# Patient Record
Sex: FEMALE | Race: WHITE | NOT HISPANIC OR LATINO | Employment: FULL TIME | URBAN - METROPOLITAN AREA
[De-identification: names, ages, dates, MRNs, and addresses within clinical notes are randomized per-mention and may not be internally consistent; named-entity substitution may affect disease eponyms.]

---

## 2017-10-03 LAB
INFLUENZA A ANTIGEN, POC: NEGATIVE
INFLUENZA B ANTIGEN, POC: NEGATIVE

## 2017-11-06 LAB — RAPID GROUP A STREP (OHS): NEGATIVE

## 2022-04-10 ENCOUNTER — HISTORICAL (OUTPATIENT)
Dept: ADMINISTRATIVE | Facility: HOSPITAL | Age: 42
End: 2022-04-10

## 2022-04-25 VITALS
HEIGHT: 62 IN | SYSTOLIC BLOOD PRESSURE: 139 MMHG | BODY MASS INDEX: 36.8 KG/M2 | DIASTOLIC BLOOD PRESSURE: 91 MMHG | OXYGEN SATURATION: 97 % | WEIGHT: 200 LBS

## 2022-09-18 ENCOUNTER — HISTORICAL (OUTPATIENT)
Dept: ADMINISTRATIVE | Facility: HOSPITAL | Age: 42
End: 2022-09-18

## 2023-01-03 DIAGNOSIS — G43.109 MIGRAINE WITH AURA AND WITHOUT STATUS MIGRAINOSUS, NOT INTRACTABLE: Primary | ICD-10-CM

## 2023-02-01 ENCOUNTER — CLINICAL SUPPORT (OUTPATIENT)
Dept: URGENT CARE | Facility: CLINIC | Age: 43
End: 2023-02-01

## 2023-02-01 DIAGNOSIS — J02.9 SORE THROAT: ICD-10-CM

## 2023-02-01 DIAGNOSIS — J02.9 SORE THROAT: Primary | ICD-10-CM

## 2023-02-01 LAB
CTP QC/QA: YES
SARS-COV-2 RDRP RESP QL NAA+PROBE: POSITIVE

## 2023-02-01 PROCEDURE — 87635 SARS-COV-2 COVID-19 AMP PRB: CPT | Mod: QW,,, | Performed by: INTERNAL MEDICINE

## 2023-02-01 PROCEDURE — 87635: ICD-10-PCS | Mod: QW,,, | Performed by: INTERNAL MEDICINE

## 2023-02-01 NOTE — PROGRESS NOTES
Pt presents to clinic for employee Covid testing. Order released; positive results entered in chart. Verbally relayed results and Instructed patient to notify one up.

## 2023-03-02 ENCOUNTER — OFFICE VISIT (OUTPATIENT)
Dept: NEUROLOGY | Facility: CLINIC | Age: 43
End: 2023-03-02
Payer: COMMERCIAL

## 2023-03-02 VITALS
DIASTOLIC BLOOD PRESSURE: 96 MMHG | HEART RATE: 84 BPM | WEIGHT: 174 LBS | BODY MASS INDEX: 32.02 KG/M2 | SYSTOLIC BLOOD PRESSURE: 140 MMHG | HEIGHT: 62 IN

## 2023-03-02 DIAGNOSIS — G62.9 PERIPHERAL POLYNEUROPATHY: ICD-10-CM

## 2023-03-02 DIAGNOSIS — E34.8 PINEAL GLAND CYST: ICD-10-CM

## 2023-03-02 DIAGNOSIS — G43.E09 CHRONIC MIGRAINE WITH AURA: ICD-10-CM

## 2023-03-02 DIAGNOSIS — G43.109 MIGRAINE WITH AURA AND WITHOUT STATUS MIGRAINOSUS, NOT INTRACTABLE: ICD-10-CM

## 2023-03-02 PROCEDURE — 4010F ACE/ARB THERAPY RXD/TAKEN: CPT | Mod: CPTII,S$GLB,, | Performed by: NURSE PRACTITIONER

## 2023-03-02 PROCEDURE — 99204 OFFICE O/P NEW MOD 45 MIN: CPT | Mod: S$GLB,,, | Performed by: NURSE PRACTITIONER

## 2023-03-02 PROCEDURE — 99999 PR PBB SHADOW E&M-EST. PATIENT-LVL III: CPT | Mod: PBBFAC,,, | Performed by: NURSE PRACTITIONER

## 2023-03-02 PROCEDURE — 1159F MED LIST DOCD IN RCRD: CPT | Mod: CPTII,S$GLB,, | Performed by: NURSE PRACTITIONER

## 2023-03-02 PROCEDURE — 99999 PR PBB SHADOW E&M-EST. PATIENT-LVL III: ICD-10-PCS | Mod: PBBFAC,,, | Performed by: NURSE PRACTITIONER

## 2023-03-02 PROCEDURE — 1159F PR MEDICATION LIST DOCUMENTED IN MEDICAL RECORD: ICD-10-PCS | Mod: CPTII,S$GLB,, | Performed by: NURSE PRACTITIONER

## 2023-03-02 PROCEDURE — 1160F PR REVIEW ALL MEDS BY PRESCRIBER/CLIN PHARMACIST DOCUMENTED: ICD-10-PCS | Mod: CPTII,S$GLB,, | Performed by: NURSE PRACTITIONER

## 2023-03-02 PROCEDURE — 3077F PR MOST RECENT SYSTOLIC BLOOD PRESSURE >= 140 MM HG: ICD-10-PCS | Mod: CPTII,S$GLB,, | Performed by: NURSE PRACTITIONER

## 2023-03-02 PROCEDURE — 99204 PR OFFICE/OUTPT VISIT, NEW, LEVL IV, 45-59 MIN: ICD-10-PCS | Mod: S$GLB,,, | Performed by: NURSE PRACTITIONER

## 2023-03-02 PROCEDURE — 4010F PR ACE/ARB THEARPY RXD/TAKEN: ICD-10-PCS | Mod: CPTII,S$GLB,, | Performed by: NURSE PRACTITIONER

## 2023-03-02 PROCEDURE — 3008F BODY MASS INDEX DOCD: CPT | Mod: CPTII,S$GLB,, | Performed by: NURSE PRACTITIONER

## 2023-03-02 PROCEDURE — 3080F PR MOST RECENT DIASTOLIC BLOOD PRESSURE >= 90 MM HG: ICD-10-PCS | Mod: CPTII,S$GLB,, | Performed by: NURSE PRACTITIONER

## 2023-03-02 PROCEDURE — 3077F SYST BP >= 140 MM HG: CPT | Mod: CPTII,S$GLB,, | Performed by: NURSE PRACTITIONER

## 2023-03-02 PROCEDURE — 3008F PR BODY MASS INDEX (BMI) DOCUMENTED: ICD-10-PCS | Mod: CPTII,S$GLB,, | Performed by: NURSE PRACTITIONER

## 2023-03-02 PROCEDURE — 1160F RVW MEDS BY RX/DR IN RCRD: CPT | Mod: CPTII,S$GLB,, | Performed by: NURSE PRACTITIONER

## 2023-03-02 PROCEDURE — 3080F DIAST BP >= 90 MM HG: CPT | Mod: CPTII,S$GLB,, | Performed by: NURSE PRACTITIONER

## 2023-03-02 RX ORDER — METFORMIN HYDROCHLORIDE 1000 MG/1
1 TABLET, FILM COATED, EXTENDED RELEASE ORAL 2 TIMES DAILY
COMMUNITY

## 2023-03-02 RX ORDER — RIZATRIPTAN BENZOATE 10 MG/1
1 TABLET ORAL
COMMUNITY

## 2023-03-02 RX ORDER — CETIRIZINE HYDROCHLORIDE 10 MG/1
10 TABLET ORAL NIGHTLY
COMMUNITY
Start: 2023-02-28 | End: 2024-03-15

## 2023-03-02 RX ORDER — ASPIRIN 81 MG/1
1 TABLET ORAL DAILY
COMMUNITY
Start: 2018-06-04

## 2023-03-02 RX ORDER — CARVEDILOL 6.25 MG/1
6.25 TABLET ORAL 2 TIMES DAILY
COMMUNITY
Start: 2022-10-03 | End: 2023-06-10

## 2023-03-02 RX ORDER — VENLAFAXINE HYDROCHLORIDE 150 MG/1
150 CAPSULE, EXTENDED RELEASE ORAL DAILY
COMMUNITY
Start: 2022-12-01 | End: 2024-03-15

## 2023-03-02 RX ORDER — PRAVASTATIN SODIUM 20 MG/1
20 TABLET ORAL NIGHTLY
COMMUNITY
Start: 2022-05-16 | End: 2024-03-15

## 2023-03-02 RX ORDER — TOPIRAMATE 50 MG/1
1 TABLET, FILM COATED ORAL NIGHTLY
COMMUNITY
Start: 2019-01-07 | End: 2024-03-15

## 2023-03-02 RX ORDER — IPRATROPIUM BROMIDE AND ALBUTEROL SULFATE 2.5; .5 MG/3ML; MG/3ML
3 SOLUTION RESPIRATORY (INHALATION) EVERY 6 HOURS PRN
COMMUNITY
Start: 2023-01-03 | End: 2024-03-15

## 2023-03-02 RX ORDER — MONTELUKAST SODIUM 10 MG/1
10 TABLET ORAL NIGHTLY
COMMUNITY
Start: 2021-05-04 | End: 2023-07-15

## 2023-03-02 RX ORDER — INSULIN LISPRO 100 [IU]/ML
INJECTION, SOLUTION INTRAVENOUS; SUBCUTANEOUS
COMMUNITY
Start: 2023-02-06

## 2023-03-02 RX ORDER — AZELASTINE 1 MG/ML
1 SPRAY, METERED NASAL DAILY
COMMUNITY
Start: 2023-02-28 | End: 2024-03-15

## 2023-03-02 RX ORDER — BENAZEPRIL HYDROCHLORIDE 20 MG/1
20 TABLET ORAL DAILY
COMMUNITY
Start: 2023-02-28 | End: 2023-11-10

## 2023-03-02 RX ORDER — FLUTICASONE PROPIONATE 50 MCG
2 SPRAY, SUSPENSION (ML) NASAL DAILY
COMMUNITY
Start: 2023-02-28 | End: 2023-08-27

## 2023-03-02 RX ORDER — SEMAGLUTIDE 1.34 MG/ML
0.5 INJECTION, SOLUTION SUBCUTANEOUS
Status: ON HOLD | COMMUNITY
Start: 2022-09-09 | End: 2023-04-13 | Stop reason: HOSPADM

## 2023-03-02 RX ORDER — ALBUTEROL SULFATE 0.83 MG/ML
2.5 SOLUTION RESPIRATORY (INHALATION) EVERY 6 HOURS PRN
COMMUNITY
Start: 2023-02-28

## 2023-03-02 RX ORDER — OMEPRAZOLE 20 MG/1
20 CAPSULE, DELAYED RELEASE ORAL
COMMUNITY

## 2023-03-02 RX ORDER — FLUTICASONE FUROATE, UMECLIDINIUM BROMIDE AND VILANTEROL TRIFENATATE 200; 62.5; 25 UG/1; UG/1; UG/1
1 POWDER RESPIRATORY (INHALATION) NIGHTLY
COMMUNITY
Start: 2023-02-08

## 2023-03-02 RX ORDER — GABAPENTIN 800 MG/1
1 TABLET ORAL NIGHTLY
COMMUNITY

## 2023-03-02 NOTE — ASSESSMENT & PLAN NOTE
-start Emgality  -Has already tried imitrex, relpax, maxalt.  Try Ubrelvy prn  -If emgality helpful and wanting to wean of topamax prior to her next follow up, she can message me and I will give her weaning instructions

## 2023-03-02 NOTE — PROGRESS NOTES
Subjective:       Patient ID: Riya Red is a 42 y.o. female.    Chief Complaint: Migraine (NP: Referred by Dr. Mani Thapa for Neuro consult to evaluate for Migraines: Since age 13 started having migraines. Migraines are 3 - 4 times a week, located to various areas of head. Migraines lasts for 3 days. Pain is sharp, stabbing: It feels like her skull is going to crack open. Does get a lot of nausea. Vision get both blurry and double. Migraines are sensitive to light, sound, and odor. Almost all the time has lot of neck pain.)      History of Present Illness:  New patient referred by Dr. Mani Thapa for migraines.  42-year-old woman with a relatively complex medical history including postsurgical DVTs, pulmonary embolism, thoracic outlet syndrome requiring subclavian aneurysm repair and rib resection.  She began having migraines as a child.  Throughout her life, she has seen several different doctors and has tried several different medications including magnesium, B2, Effexor, Cymbalta, gabapentin, Topamax, nortriptyline, Imitrex, Relpax, Maxalt.  When she contracted COVID in 2020, her migraines became much more severe in nature.  She did require hospitalization, but was not vented.  Over the last year so, her migraines have been occurring 3 to 4 times a week and will last for about 2-3 days at a time, but have lasted up to 6 days.  She is currently on Topamax for preventive and Maxalt for rescue.  When she 1st started the Topamax many years ago, it was very helpful for her, but no longer seems to be effective.  She has side effects of word-finding and brain fog so an increase in dosage is not ideal.  She says the Maxalt is helpful, but only lasts for about 4 hours.  Her migraines can occur in several different regions including the right or left temporal region, the base of her skull, or retro orbitally on either side.  She describes the pain as is severe ice pick type pain with associated photophobia,  phonophobia, osmophobia, and nausea.  Before she gets a migraine, she sees flashes of lights in her vision and experiences word-finding as well as an increase in yawning.  Triggers include weather changes and her menstrual cycle.  Family history of migraines in her son.    In 2018, she developed a lower extremity DVT postoperatively after an ankle surgery.  The DVT ultimately resolved.  Later, she developed a DVT in the right upper extremity that led to a PE requiring immediate clot removal.  She was found to have diabetes at that time.  Her A1c was greater than 13.  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  This was done over 10 years ago and we do not have the records.      Review of Systems  I have reviewed a 12 point review of systems which is scanned into the chart        Past Medical History:   Diagnosis Date    DVT (deep venous thrombosis)     Essential (primary) hypertension     Migraine     Mild intermittent asthma, uncomplicated     Mixed hyperlipidemia     Other pulmonary embolism without acute cor pulmonale     Type 2 diabetes mellitus without complications        Past Surgical History:   Procedure Laterality Date     SECTION      CHOLECYSTECTOMY      2015    EMBOLECTOMY      THORACIC OUTLET SURGERY          Family History   Problem Relation Age of Onset    Dementia Mother     Hypertension Mother     Depression Mother         Social History     Socioeconomic History    Marital status:    Tobacco Use    Smoking status: Never    Smokeless tobacco: Never   Substance and Sexual Activity    Alcohol use: Never    Drug use: Never        Outpatient Encounter Medications as of 3/2/2023   Medication Sig Dispense Refill    albuterol (PROVENTIL) 2.5 mg /3 mL (0.083 %) nebulizer solution Inhale 2.5 mg into the lungs every 6 (six) hours as needed.      albuterol-ipratropium (DUO-NEB) 2.5 mg-0.5 mg/3 mL nebulizer solution Inhale 3 mLs into the  lungs every 6 (six) hours as needed.      aspirin (ECOTRIN) 81 MG EC tablet Take 1 tablet by mouth Daily.      azelastine (ASTELIN) 137 mcg (0.1 %) nasal spray 1 spray by Nasal route Daily.      benazepriL (LOTENSIN) 20 MG tablet Take 20 mg by mouth Daily.      carvediloL (COREG) 6.25 MG tablet Take 6.25 mg by mouth 2 (two) times a day.      cetirizine (ZYRTEC) 10 MG tablet Take 10 mg by mouth every evening.      fluticasone propionate (FLONASE) 50 mcg/actuation nasal spray 2 sprays by Nasal route Daily.      gabapentin (NEURONTIN) 800 MG tablet Take 1 tablet by mouth every evening.      insulin lispro 100 unit/mL injection Inject into the skin as needed.      metFORMIN (GLUMETZA) 1000 MG (MOD) 24hr tablet Take 1 tablet by mouth 2 (two) times a day.      montelukast (SINGULAIR) 10 mg tablet Take 10 mg by mouth every evening.      omeprazole (PRILOSEC) 20 MG capsule Take 20 mg by mouth as needed.      OZEMPIC 0.25 mg or 0.5 mg(2 mg/1.5 mL) pen injector Inject 0.5 mg into the skin every 7 days.      pravastatin (PRAVACHOL) 20 MG tablet Take 20 mg by mouth every evening.      rizatriptan (MAXALT) 10 MG tablet Take 1 tablet by mouth as needed.      topiramate (TOPAMAX) 50 MG tablet Take 1 tablet by mouth every evening.      TRELEGY ELLIPTA 200-62.5-25 mcg inhaler Inhale 1 puff into the lungs every evening.      venlafaxine (EFFEXOR-XR) 150 MG Cp24 Take 150 mg by mouth Daily.      [DISCONTINUED] ubrogepant (UBRELVY) 100 mg tablet Take 100 mg by mouth daily as needed.      [] galcanezumab-gnlm 120 mg/mL PnIj Inject 240 mg into the skin once. 240 mg loading dose (administered as two consecutive injections of 120 mg each) for 1 dose 2 mL 0    galcanezumab-gnlm 120 mg/mL PnIj Inject 120 mg into the skin every 28 days. maintenance dose 1 mL 5    [] ubrogepant (UBRELVY) 100 mg tablet Take 1 tablet (100 mg total) by mouth once as needed for Migraine (can repeat dose in 2 hour if needed, do not exceed 200 mg in 24  "hours). 16 tablet 5     No facility-administered encounter medications on file as of 3/2/2023.      Objective:   BP (!) 140/96 (BP Location: Left arm)   Pulse 84   Ht 5' 2" (1.575 m)   Wt 78.9 kg (174 lb)   BMI 31.83 kg/m²        Physical Exam  General:  Alert and oriented  NAD  No overt edema    Cognition and Comprehension:  Speech and language intact  Follows commands    Cranial nerves:   CN 2_ Visual fields (full to confrontation both eyes)  CN 3, 4, 6_ Intact, ROBBY, no nystagmus  CN 5_facial tactile sensation intact  CN 7_no face asymmetry; normal eye closure and smile  CN 8_hearing intact to spoken voice  CN 9, 10, 11_voice normal, shoulder shrug ok; deltoids not fatigable   CN 12 tongue_protrudes mid line    Muscle Strength and Tone:  Normal upper extremity tone  Normal lower extremity tone  Normal upper extremity strength  Normal lower extremity strength    Reflexes:  Globally hypo  Absent ankle jerks    Sensation:  Decr sensation to PP BUE and BLE in gradient distribution    Coordination and Gait:  Coordination and gait are normal   No ataxia with FTN or HKS      Assessment & Plan:      1. Chronic migraine with aura  Overview:  She began having migraines as a child.  She has tried several different medications including magnesium, B2, Effexor, Cymbalta, gabapentin, Topamax, nortriptyline, Imitrex, Relpax, Maxalt.  She is having migraines 3 to 4 times a week and they will last for about 2-3 days at a time, but have lasted up to 6 days.  She is currently on Topamax for preventive and Maxalt for rescue.  When she 1st started the Topamax many years ago, it was very helpful for her, but no longer seems to be effective.  She has side effects of word-finding and brain fog so an increase in dosage is not ideal.  She says the Maxalt is helpful, but only lasts for about 4 hours.  Her migraines can occur in several different regions including the right or left temporal region, the base of her skull, or retro " orbitally on either side.  She describes the pain as is severe ice pick type pain with associated photophobia, phonophobia, osmophobia, and nausea.  Before she gets a migraine, she sees flashes of lights in her vision and experiences word-finding as well as an increase in yawning.  Triggers include weather changes and her menstrual cycle    Assessment & Plan:  -start Emgality  -Has already tried imitrex, relpax, maxalt.  Try Ubrelvy prn  -If emgality helpful and wanting to wean of topamax prior to her next follow up, she can message me and I will give her weaning instructions        2. Pineal gland cyst  Assessment & Plan:  -Need updated brain imaging given last MRI was over 10 years ago in [place]    Orders:  -     MRI Brain W WO Contrast; Future; Expected date: 03/02/2023    3. Peripheral polyneuropathy  Assessment & Plan:  -Likely diabetic neuropathy.  TSH normal.  Get B12 and NONA.  Not painful so will just monitor clinically    Orders:  -     Vitamin B12; Future; Expected date: 03/02/2023  -     Immunofixation & Protein Electrophoresis & Immunoglobulins; Future; Expected date: 03/02/2023    4. Migraine with aura and without status migrainosus, not intractable  -     Ambulatory referral/consult to Neurology  -     galcanezumab-gnlm 120 mg/mL PnIj; Inject 240 mg into the skin once. 240 mg loading dose (administered as two consecutive injections of 120 mg each) for 1 dose  Dispense: 2 mL; Refill: 0  -     galcanezumab-gnlm 120 mg/mL PnIj; Inject 120 mg into the skin every 28 days. maintenance dose  Dispense: 1 mL; Refill: 5  -     ubrogepant (UBRELVY) 100 mg tablet; Take 1 tablet (100 mg total) by mouth once as needed for Migraine (can repeat dose in 2 hour if needed, do not exceed 200 mg in 24 hours).  Dispense: 16 tablet; Refill: 5          This note was created with the assistance of voice recognition software. There may be transcription errors as a result of using this technology however minimal. Effort has been  made to assure accuracy of transcription but any obvious errors or omissions should be clarified with the author of the document.

## 2023-03-02 NOTE — ASSESSMENT & PLAN NOTE
-Likely diabetic neuropathy.  TSH normal.  Get B12 and NONA.  Not painful so will just monitor clinically

## 2023-03-07 ENCOUNTER — TELEPHONE (OUTPATIENT)
Dept: NEUROLOGY | Facility: CLINIC | Age: 43
End: 2023-03-07
Payer: COMMERCIAL

## 2023-03-10 ENCOUNTER — LAB VISIT (OUTPATIENT)
Dept: LAB | Facility: HOSPITAL | Age: 43
End: 2023-03-10
Attending: NURSE PRACTITIONER
Payer: COMMERCIAL

## 2023-03-10 DIAGNOSIS — G62.9 PERIPHERAL POLYNEUROPATHY: ICD-10-CM

## 2023-03-10 LAB
IGA SERPL-MCNC: 175 MG/DL (ref 65–421)
IGG SERPL-MCNC: 1020 MG/DL (ref 522–1631)
IGM SERPL-MCNC: 48 MG/DL (ref 33–293)
VIT B12 SERPL-MCNC: 336 PG/ML (ref 213–816)

## 2023-03-10 PROCEDURE — 82784 ASSAY IGA/IGD/IGG/IGM EACH: CPT

## 2023-03-10 PROCEDURE — 86334 IMMUNOFIX E-PHORESIS SERUM: CPT

## 2023-03-10 PROCEDURE — 84165 PROTEIN E-PHORESIS SERUM: CPT

## 2023-03-10 PROCEDURE — 83521 IG LIGHT CHAINS FREE EACH: CPT | Mod: 90

## 2023-03-10 PROCEDURE — 36415 COLL VENOUS BLD VENIPUNCTURE: CPT

## 2023-03-10 PROCEDURE — 82607 VITAMIN B-12: CPT

## 2023-03-13 ENCOUNTER — TELEPHONE (OUTPATIENT)
Dept: NEUROLOGY | Facility: CLINIC | Age: 43
End: 2023-03-13
Payer: COMMERCIAL

## 2023-03-13 LAB
ALBUMIN % SPEP (OHS): 48.16
ALBUMIN SERPL-MCNC: 3.3 G/DL (ref 3.5–5)
ALBUMIN/GLOB SERPL: 0.9 RATIO (ref 1.1–2)
ALPHA 1 GLOB (OHS): 0.26 GM/DL
ALPHA 1 GLOB% (OHS): 3.79
ALPHA 2 GLOB % (OHS): 11.48
ALPHA 2 GLOB (OHS): 0.78 GM/DL
BETA GLOB (OHS): 1.35 GM/DL
BETA GLOB% (OHS): 19.86
GAMMA GLOBULIN % (OHS): 16.71
GAMMA GLOBULIN (OHS): 1.14 GM/DL
GLOBULIN SER-MCNC: 3.5 GM/DL (ref 2.4–3.5)
KAPPA LC FREE SER-MCNC: 1.76 MG/DL (ref 0.33–1.94)
KAPPA LC FREE/LAMBDA FREE SER: 1.07 {RATIO} (ref 0.26–1.65)
LAMBDA LC FREE SERPL-MCNC: 1.64 MG/DL (ref 0.57–2.63)
M SPIKE % (OHS): ABNORMAL
M SPIKE (OHS): ABNORMAL
PATH REV: NORMAL
PROT SERPL-MCNC: 6.8 GM/DL (ref 6.4–8.3)

## 2023-03-13 NOTE — TELEPHONE ENCOUNTER
----- Message from SERGIO Raymond sent at 3/13/2023  7:39 AM CDT -----  B12 is a little lower than we like in neurology.  We like B12 level at 500 or above.  Anything lower can contribute to neuropathy.  Have her supplement with b12 2000 mcg daily

## 2023-03-14 ENCOUNTER — PATIENT MESSAGE (OUTPATIENT)
Dept: NEUROLOGY | Facility: CLINIC | Age: 43
End: 2023-03-14
Payer: COMMERCIAL

## 2023-04-05 ENCOUNTER — PATIENT MESSAGE (OUTPATIENT)
Dept: NEUROLOGY | Facility: CLINIC | Age: 43
End: 2023-04-05
Payer: COMMERCIAL

## 2023-04-11 ENCOUNTER — PATIENT MESSAGE (OUTPATIENT)
Dept: NEUROLOGY | Facility: CLINIC | Age: 43
End: 2023-04-11
Payer: COMMERCIAL

## 2023-04-12 ENCOUNTER — HOSPITAL ENCOUNTER (OUTPATIENT)
Facility: HOSPITAL | Age: 43
Discharge: HOME | End: 2023-04-13
Attending: EMERGENCY MEDICINE | Admitting: STUDENT IN AN ORGANIZED HEALTH CARE EDUCATION/TRAINING PROGRAM
Payer: COMMERCIAL

## 2023-04-12 DIAGNOSIS — R10.10 UPPER ABDOMINAL PAIN: ICD-10-CM

## 2023-04-12 DIAGNOSIS — K85.30 DRUG-INDUCED ACUTE PANCREATITIS WITHOUT INFECTION OR NECROSIS: Primary | ICD-10-CM

## 2023-04-12 DIAGNOSIS — R07.9 CHEST PAIN: ICD-10-CM

## 2023-04-12 DIAGNOSIS — K85.90 ACUTE PANCREATITIS WITHOUT INFECTION OR NECROSIS, UNSPECIFIED PANCREATITIS TYPE: ICD-10-CM

## 2023-04-12 LAB
ALBUMIN SERPL-MCNC: 4.2 G/DL (ref 3.5–5)
ALBUMIN/GLOB SERPL: 1.4 RATIO (ref 1.1–2)
ALP SERPL-CCNC: 55 UNIT/L (ref 40–150)
ALT SERPL-CCNC: 13 UNIT/L (ref 0–55)
APPEARANCE UR: CLEAR
AST SERPL-CCNC: 12 UNIT/L (ref 5–34)
B-HCG SERPL QL: NEGATIVE
BACTERIA #/AREA URNS AUTO: ABNORMAL /HPF
BASOPHILS # BLD AUTO: 0.05 X10(3)/MCL (ref 0–0.2)
BASOPHILS NFR BLD AUTO: 0.7 %
BILIRUB UR QL STRIP.AUTO: NEGATIVE MG/DL
BILIRUBIN DIRECT+TOT PNL SERPL-MCNC: 0.3 MG/DL
BUN SERPL-MCNC: 9.6 MG/DL (ref 7–18.7)
CALCIUM SERPL-MCNC: 9.7 MG/DL (ref 8.4–10.2)
CHLORIDE SERPL-SCNC: 104 MMOL/L (ref 98–107)
CHOLEST SERPL-MCNC: 114 MG/DL
CHOLEST/HDLC SERPL: 4 {RATIO} (ref 0–5)
CO2 SERPL-SCNC: 25 MMOL/L (ref 22–29)
COLOR UR AUTO: YELLOW
CREAT SERPL-MCNC: 0.75 MG/DL (ref 0.55–1.02)
EOSINOPHIL # BLD AUTO: 0.1 X10(3)/MCL (ref 0–0.9)
EOSINOPHIL NFR BLD AUTO: 1.4 %
ERYTHROCYTE [DISTWIDTH] IN BLOOD BY AUTOMATED COUNT: 14.1 % (ref 11.5–17)
EST. AVERAGE GLUCOSE BLD GHB EST-MCNC: 128.4 MG/DL
GFR SERPLBLD CREATININE-BSD FMLA CKD-EPI: >60 MLS/MIN/1.73/M2
GLOBULIN SER-MCNC: 3 GM/DL (ref 2.4–3.5)
GLUCOSE SERPL-MCNC: 120 MG/DL (ref 74–100)
GLUCOSE UR QL STRIP.AUTO: NEGATIVE MG/DL
HBA1C MFR BLD: 6.1 %
HCT VFR BLD AUTO: 40.3 % (ref 37–47)
HDLC SERPL-MCNC: 29 MG/DL (ref 35–60)
HGB BLD-MCNC: 13.1 G/DL (ref 12–16)
IMM GRANULOCYTES # BLD AUTO: 0.03 X10(3)/MCL (ref 0–0.04)
IMM GRANULOCYTES NFR BLD AUTO: 0.4 %
KETONES UR QL STRIP.AUTO: NEGATIVE MG/DL
LDLC SERPL CALC-MCNC: 67 MG/DL (ref 50–140)
LEUKOCYTE ESTERASE UR QL STRIP.AUTO: NEGATIVE UNIT/L
LIPASE SERPL-CCNC: 631 U/L
LYMPHOCYTES # BLD AUTO: 1.81 X10(3)/MCL (ref 0.6–4.6)
LYMPHOCYTES NFR BLD AUTO: 24.7 %
MCH RBC QN AUTO: 26.9 PG (ref 27–31)
MCHC RBC AUTO-ENTMCNC: 32.5 G/DL (ref 33–36)
MCV RBC AUTO: 82.8 FL (ref 80–94)
MONOCYTES # BLD AUTO: 0.56 X10(3)/MCL (ref 0.1–1.3)
MONOCYTES NFR BLD AUTO: 7.7 %
NEUTROPHILS # BLD AUTO: 4.77 X10(3)/MCL (ref 2.1–9.2)
NEUTROPHILS NFR BLD AUTO: 65.1 %
NITRITE UR QL STRIP.AUTO: NEGATIVE
NRBC BLD AUTO-RTO: 0 %
PH UR STRIP.AUTO: 5.5 [PH]
PLATELET # BLD AUTO: 392 X10(3)/MCL (ref 130–400)
PMV BLD AUTO: 8.8 FL (ref 7.4–10.4)
POCT GLUCOSE: 79 MG/DL (ref 70–110)
POCT GLUCOSE: 90 MG/DL (ref 70–110)
POTASSIUM SERPL-SCNC: 3.7 MMOL/L (ref 3.5–5.1)
PROT SERPL-MCNC: 7.2 GM/DL (ref 6.4–8.3)
PROT UR QL STRIP.AUTO: NEGATIVE MG/DL
RBC # BLD AUTO: 4.87 X10(6)/MCL (ref 4.2–5.4)
RBC #/AREA URNS AUTO: 14 /HPF
RBC UR QL AUTO: NEGATIVE UNIT/L
SODIUM SERPL-SCNC: 137 MMOL/L (ref 136–145)
SP GR UR STRIP.AUTO: 1.02 (ref 1–1.03)
SQUAMOUS #/AREA URNS AUTO: <5 /HPF
TRIGL SERPL-MCNC: 88 MG/DL (ref 37–140)
UROBILINOGEN UR STRIP-ACNC: 0.2 MG/DL
VLDLC SERPL CALC-MCNC: 18 MG/DL
WBC # SPEC AUTO: 7.3 X10(3)/MCL (ref 4.5–11.5)
WBC #/AREA URNS AUTO: <5 /HPF

## 2023-04-12 PROCEDURE — G0378 HOSPITAL OBSERVATION PER HR: HCPCS

## 2023-04-12 PROCEDURE — 85025 COMPLETE CBC W/AUTO DIFF WBC: CPT | Performed by: NURSE PRACTITIONER

## 2023-04-12 PROCEDURE — 81001 URINALYSIS AUTO W/SCOPE: CPT | Performed by: NURSE PRACTITIONER

## 2023-04-12 PROCEDURE — 96361 HYDRATE IV INFUSION ADD-ON: CPT

## 2023-04-12 PROCEDURE — 25000003 PHARM REV CODE 250: Performed by: NURSE PRACTITIONER

## 2023-04-12 PROCEDURE — 82962 GLUCOSE BLOOD TEST: CPT

## 2023-04-12 PROCEDURE — 80053 COMPREHEN METABOLIC PANEL: CPT | Performed by: NURSE PRACTITIONER

## 2023-04-12 PROCEDURE — 25500020 PHARM REV CODE 255: Performed by: EMERGENCY MEDICINE

## 2023-04-12 PROCEDURE — 81025 URINE PREGNANCY TEST: CPT | Performed by: NURSE PRACTITIONER

## 2023-04-12 PROCEDURE — 99285 EMERGENCY DEPT VISIT HI MDM: CPT | Mod: 25

## 2023-04-12 PROCEDURE — 96374 THER/PROPH/DIAG INJ IV PUSH: CPT

## 2023-04-12 PROCEDURE — 25000003 PHARM REV CODE 250: Performed by: STUDENT IN AN ORGANIZED HEALTH CARE EDUCATION/TRAINING PROGRAM

## 2023-04-12 PROCEDURE — 83036 HEMOGLOBIN GLYCOSYLATED A1C: CPT | Performed by: STUDENT IN AN ORGANIZED HEALTH CARE EDUCATION/TRAINING PROGRAM

## 2023-04-12 PROCEDURE — 25000003 PHARM REV CODE 250: Performed by: PHYSICIAN ASSISTANT

## 2023-04-12 PROCEDURE — 83690 ASSAY OF LIPASE: CPT | Performed by: NURSE PRACTITIONER

## 2023-04-12 PROCEDURE — 93010 ELECTROCARDIOGRAM REPORT: CPT | Mod: ,,, | Performed by: INTERNAL MEDICINE

## 2023-04-12 PROCEDURE — 63600175 PHARM REV CODE 636 W HCPCS: Performed by: PHYSICIAN ASSISTANT

## 2023-04-12 PROCEDURE — 93010 EKG 12-LEAD: ICD-10-PCS | Mod: ,,, | Performed by: INTERNAL MEDICINE

## 2023-04-12 PROCEDURE — 80061 LIPID PANEL: CPT | Performed by: STUDENT IN AN ORGANIZED HEALTH CARE EDUCATION/TRAINING PROGRAM

## 2023-04-12 PROCEDURE — 63600175 PHARM REV CODE 636 W HCPCS: Performed by: NURSE PRACTITIONER

## 2023-04-12 PROCEDURE — 93005 ELECTROCARDIOGRAM TRACING: CPT

## 2023-04-12 RX ORDER — METOCLOPRAMIDE HYDROCHLORIDE 5 MG/ML
10 INJECTION INTRAMUSCULAR; INTRAVENOUS
Status: COMPLETED | OUTPATIENT
Start: 2023-04-12 | End: 2023-04-12

## 2023-04-12 RX ORDER — IPRATROPIUM BROMIDE AND ALBUTEROL SULFATE 2.5; .5 MG/3ML; MG/3ML
3 SOLUTION RESPIRATORY (INHALATION) EVERY 6 HOURS PRN
Status: DISCONTINUED | OUTPATIENT
Start: 2023-04-12 | End: 2023-04-13 | Stop reason: HOSPADM

## 2023-04-12 RX ORDER — CETIRIZINE HYDROCHLORIDE 10 MG/1
10 TABLET ORAL NIGHTLY
Status: DISCONTINUED | OUTPATIENT
Start: 2023-04-12 | End: 2023-04-13 | Stop reason: HOSPADM

## 2023-04-12 RX ORDER — SODIUM CHLORIDE, SODIUM LACTATE, POTASSIUM CHLORIDE, CALCIUM CHLORIDE 600; 310; 30; 20 MG/100ML; MG/100ML; MG/100ML; MG/100ML
INJECTION, SOLUTION INTRAVENOUS CONTINUOUS
Status: DISCONTINUED | OUTPATIENT
Start: 2023-04-12 | End: 2023-04-12

## 2023-04-12 RX ORDER — ACETAMINOPHEN 325 MG/1
650 TABLET ORAL EVERY 4 HOURS PRN
Status: DISCONTINUED | OUTPATIENT
Start: 2023-04-12 | End: 2023-04-13 | Stop reason: HOSPADM

## 2023-04-12 RX ORDER — SIMETHICONE 80 MG
1 TABLET,CHEWABLE ORAL 4 TIMES DAILY PRN
Status: DISCONTINUED | OUTPATIENT
Start: 2023-04-12 | End: 2023-04-13 | Stop reason: HOSPADM

## 2023-04-12 RX ORDER — INSULIN ASPART 100 [IU]/ML
0-5 INJECTION, SOLUTION INTRAVENOUS; SUBCUTANEOUS
Status: DISCONTINUED | OUTPATIENT
Start: 2023-04-12 | End: 2023-04-13 | Stop reason: HOSPADM

## 2023-04-12 RX ORDER — LISINOPRIL 20 MG/1
20 TABLET ORAL DAILY
Status: DISCONTINUED | OUTPATIENT
Start: 2023-04-13 | End: 2023-04-13 | Stop reason: HOSPADM

## 2023-04-12 RX ORDER — NALOXONE HCL 0.4 MG/ML
0.02 VIAL (ML) INJECTION
Status: DISCONTINUED | OUTPATIENT
Start: 2023-04-12 | End: 2023-04-13 | Stop reason: HOSPADM

## 2023-04-12 RX ORDER — VENLAFAXINE HYDROCHLORIDE 150 MG/1
150 CAPSULE, EXTENDED RELEASE ORAL DAILY
Status: DISCONTINUED | OUTPATIENT
Start: 2023-04-12 | End: 2023-04-13 | Stop reason: HOSPADM

## 2023-04-12 RX ORDER — ASPIRIN 81 MG/1
81 TABLET ORAL DAILY
Status: DISCONTINUED | OUTPATIENT
Start: 2023-04-12 | End: 2023-04-13 | Stop reason: HOSPADM

## 2023-04-12 RX ORDER — TALC
6 POWDER (GRAM) TOPICAL NIGHTLY PRN
Status: DISCONTINUED | OUTPATIENT
Start: 2023-04-12 | End: 2023-04-13 | Stop reason: HOSPADM

## 2023-04-12 RX ORDER — SUMATRIPTAN SUCCINATE 25 MG/1
50 TABLET ORAL
Status: DISCONTINUED | OUTPATIENT
Start: 2023-04-12 | End: 2023-04-13 | Stop reason: HOSPADM

## 2023-04-12 RX ORDER — GLUCAGON 1 MG
1 KIT INJECTION
Status: DISCONTINUED | OUTPATIENT
Start: 2023-04-12 | End: 2023-04-13 | Stop reason: HOSPADM

## 2023-04-12 RX ORDER — CARVEDILOL 3.12 MG/1
6.25 TABLET ORAL 2 TIMES DAILY
Status: DISCONTINUED | OUTPATIENT
Start: 2023-04-12 | End: 2023-04-13 | Stop reason: HOSPADM

## 2023-04-12 RX ORDER — FLUTICASONE PROPIONATE 50 MCG
2 SPRAY, SUSPENSION (ML) NASAL DAILY PRN
Status: DISCONTINUED | OUTPATIENT
Start: 2023-04-12 | End: 2023-04-13 | Stop reason: HOSPADM

## 2023-04-12 RX ORDER — MONTELUKAST SODIUM 10 MG/1
10 TABLET ORAL NIGHTLY
Status: DISCONTINUED | OUTPATIENT
Start: 2023-04-12 | End: 2023-04-13 | Stop reason: HOSPADM

## 2023-04-12 RX ORDER — IBUPROFEN 200 MG
16 TABLET ORAL
Status: DISCONTINUED | OUTPATIENT
Start: 2023-04-12 | End: 2023-04-13 | Stop reason: HOSPADM

## 2023-04-12 RX ORDER — GABAPENTIN 400 MG/1
800 CAPSULE ORAL NIGHTLY
Status: DISCONTINUED | OUTPATIENT
Start: 2023-04-12 | End: 2023-04-13 | Stop reason: HOSPADM

## 2023-04-12 RX ORDER — SODIUM CHLORIDE 0.9 % (FLUSH) 0.9 %
10 SYRINGE (ML) INJECTION EVERY 12 HOURS PRN
Status: DISCONTINUED | OUTPATIENT
Start: 2023-04-12 | End: 2023-04-13 | Stop reason: HOSPADM

## 2023-04-12 RX ORDER — PANTOPRAZOLE SODIUM 40 MG/1
40 TABLET, DELAYED RELEASE ORAL DAILY
Status: DISCONTINUED | OUTPATIENT
Start: 2023-04-13 | End: 2023-04-13 | Stop reason: HOSPADM

## 2023-04-12 RX ORDER — IBUPROFEN 200 MG
24 TABLET ORAL
Status: DISCONTINUED | OUTPATIENT
Start: 2023-04-12 | End: 2023-04-13 | Stop reason: HOSPADM

## 2023-04-12 RX ORDER — PRAVASTATIN SODIUM 10 MG/1
20 TABLET ORAL NIGHTLY
Status: DISCONTINUED | OUTPATIENT
Start: 2023-04-12 | End: 2023-04-13 | Stop reason: HOSPADM

## 2023-04-12 RX ORDER — TOPIRAMATE 25 MG/1
50 TABLET ORAL NIGHTLY
Status: DISCONTINUED | OUTPATIENT
Start: 2023-04-12 | End: 2023-04-13 | Stop reason: HOSPADM

## 2023-04-12 RX ORDER — SODIUM CHLORIDE, SODIUM LACTATE, POTASSIUM CHLORIDE, CALCIUM CHLORIDE 600; 310; 30; 20 MG/100ML; MG/100ML; MG/100ML; MG/100ML
INJECTION, SOLUTION INTRAVENOUS CONTINUOUS
Status: DISCONTINUED | OUTPATIENT
Start: 2023-04-12 | End: 2023-04-13 | Stop reason: HOSPADM

## 2023-04-12 RX ADMIN — VENLAFAXINE HYDROCHLORIDE 150 MG: 150 CAPSULE, EXTENDED RELEASE ORAL at 04:04

## 2023-04-12 RX ADMIN — TOPIRAMATE 50 MG: 25 TABLET, FILM COATED ORAL at 09:04

## 2023-04-12 RX ADMIN — SODIUM CHLORIDE 1000 ML: 9 INJECTION, SOLUTION INTRAVENOUS at 11:04

## 2023-04-12 RX ADMIN — ASPIRIN 81 MG: 81 TABLET, COATED ORAL at 04:04

## 2023-04-12 RX ADMIN — MONTELUKAST 10 MG: 10 TABLET, FILM COATED ORAL at 09:04

## 2023-04-12 RX ADMIN — METOCLOPRAMIDE 10 MG: 5 INJECTION, SOLUTION INTRAMUSCULAR; INTRAVENOUS at 12:04

## 2023-04-12 RX ADMIN — CARVEDILOL 6.25 MG: 3.12 TABLET, FILM COATED ORAL at 09:04

## 2023-04-12 RX ADMIN — IOPAMIDOL 100 ML: 755 INJECTION, SOLUTION INTRAVENOUS at 02:04

## 2023-04-12 RX ADMIN — PRAVASTATIN SODIUM 20 MG: 10 TABLET ORAL at 09:04

## 2023-04-12 RX ADMIN — SODIUM CHLORIDE 1000 ML: 9 INJECTION, SOLUTION INTRAVENOUS at 04:04

## 2023-04-12 RX ADMIN — GABAPENTIN 800 MG: 400 CAPSULE ORAL at 09:04

## 2023-04-12 RX ADMIN — SODIUM CHLORIDE, POTASSIUM CHLORIDE, SODIUM LACTATE AND CALCIUM CHLORIDE: 600; 310; 30; 20 INJECTION, SOLUTION INTRAVENOUS at 05:04

## 2023-04-12 NOTE — ED PROVIDER NOTES
Encounter Date: 2023       History     Chief Complaint   Patient presents with    Abdominal Pain     Pt. C/o upper abdominal pain x 1 month and diarrhea. Pt. Reports recent labs showed elevated Lipase.      42-year-old female with a history of hypertension, diabetes, asthma, DVT,  pulmonary embolism, and thoracic outlet syndrome presents the ED with intermittent epigastric abdominal pain and diarrhea for the last month with intermittent nausea. Notes over the last week she also been having shortness of breath and chest pain.  States she has been using her inhaler with minimal relief.  States she called her doctor recently due to her ongoing symptoms, and had labs done yesterday and was told her lipase and amylase were extremely high. Notes having 1 episode of pancreatitis approximately 10 years ago, however notes it was due to her gallbladder which has since been removed.  Denies vomiting, fever, chills, cough, congestion, lower extremity pain or swelling. No ETOH use. States she also was told to stop taking her Ozempic this weekend by her PCP, last dose was Saturday.       The history is provided by the patient. No  was used.   Review of patient's allergies indicates:   Allergen Reactions    Bactrim [sulfamethoxazole-trimethoprim] Rash     Past Medical History:   Diagnosis Date    DVT (deep venous thrombosis)     Essential (primary) hypertension     Migraine     Mild intermittent asthma, uncomplicated     Mixed hyperlipidemia     Other pulmonary embolism without acute cor pulmonale     Type 2 diabetes mellitus without complications      Past Surgical History:   Procedure Laterality Date     SECTION      CHOLECYSTECTOMY      2015    EMBOLECTOMY      THORACIC OUTLET SURGERY       Family History   Problem Relation Age of Onset    Dementia Mother     Hypertension Mother     Depression Mother      Social History     Tobacco Use    Smoking status: Never    Smokeless tobacco: Never    Substance Use Topics    Alcohol use: Never    Drug use: Never     Review of Systems   Constitutional:  Negative for chills and fever.   Eyes:  Negative for visual disturbance.   Respiratory:  Positive for shortness of breath. Negative for cough.    Cardiovascular:  Positive for chest pain.   Gastrointestinal:  Positive for abdominal pain, diarrhea and nausea. Negative for vomiting.   Genitourinary:  Negative for dysuria.   Musculoskeletal:  Negative for arthralgias.   Skin:  Negative for color change and rash.   Neurological:  Negative for dizziness and headaches.   Psychiatric/Behavioral:  Negative for behavioral problems.    All other systems reviewed and are negative.    Physical Exam     Initial Vitals [04/12/23 0933]   BP Pulse Resp Temp SpO2   (!) 158/109 91 20 99 °F (37.2 °C) 98 %      MAP       --         Physical Exam    Nursing note and vitals reviewed.  Constitutional: She appears well-developed and well-nourished.   HENT:   Head: Normocephalic and atraumatic.   Mouth/Throat: Uvula is midline. Mucous membranes are dry.   Eyes: EOM are normal. Pupils are equal, round, and reactive to light.   Neck: Neck supple.   Cardiovascular:  Normal rate, regular rhythm and normal heart sounds.           Pulmonary/Chest: Breath sounds normal.   Abdominal: Abdomen is soft. Bowel sounds are normal. There is abdominal tenderness in the epigastric area. There is no rebound and no guarding.   Musculoskeletal:         General: Normal range of motion.      Cervical back: Neck supple.     Neurological: She is alert and oriented to person, place, and time. She has normal strength. GCS score is 15. GCS eye subscore is 4. GCS verbal subscore is 5. GCS motor subscore is 6.   Skin: Skin is warm and dry.   Psychiatric: She has a normal mood and affect.       ED Course   Procedures  Labs Reviewed   CBC WITH DIFFERENTIAL - Abnormal; Notable for the following components:       Result Value    MCH 26.9 (*)     MCHC 32.5 (*)     All  other components within normal limits   COMPREHENSIVE METABOLIC PANEL - Abnormal; Notable for the following components:    Glucose Level 120 (*)     All other components within normal limits   LIPASE - Abnormal; Notable for the following components:    Lipase Level 631 (*)     All other components within normal limits   URINALYSIS, MICROSCOPIC - Abnormal; Notable for the following components:    RBC, UA 14 (*)     All other components within normal limits   URINALYSIS, REFLEX TO URINE CULTURE - Normal   PREGNANCY TEST, URINE RAPID - Normal     EKG Readings: (Independently Interpreted)   Initial Reading: No STEMI. Rhythm: Normal Sinus Rhythm. Heart Rate: 90. Ectopy: No Ectopy. Conduction: Normal. ST Segments: Normal ST Segments. T Waves: Normal. Axis: Normal.   ECG Results              EKG 12-lead (Final result)  Result time 04/12/23 10:43:36      Final result by Interface, Lab In [facility] (04/12/23 10:43:36)                   Narrative:    Test Reason : R10.10,    Vent. Rate : 090 BPM     Atrial Rate : 090 BPM     P-R Int : 164 ms          QRS Dur : 086 ms      QT Int : 352 ms       P-R-T Axes : 041 226 -04 degrees     QTc Int : 430 ms    Normal sinus rhythm  Possible Anterolateral infarct ,age undetermined  Abnormal ECG  No previous ECGs available  Confirmed by Carlos Barclay MD ([id number]) on 4/12/2023 10:43:30 AM    Referred By:             Confirmed By:Carlos Barclay MD                                  Imaging Results              CT Abdomen Pelvis With Contrast (Final result)  Result time 04/12/23 14:26:10      Final result by Naresh Schmidt MD (04/12/23 14:26:10)                   Impression:      1. Bladder wall thickening, question cystitis.  2. Otherwise no acute abdominopelvic findings.      Electronically signed by: Naresh Schmidt  Date:    04/12/2023  Time:    14:26               Narrative:    EXAMINATION:  CT ABDOMEN PELVIS WITH CONTRAST    CLINICAL HISTORY:  Pancreatitis, acute, severe;Epigastric  pain;    TECHNIQUE:  Helical acquisition through the abdomen and pelvis with IV contrast.  Three plane reconstructions were provided for review.  mGycm. Automatic exposure control, adjustment of mA/kV or iterative reconstruction technique was used to reduce radiation.    COMPARISON:  No priors    FINDINGS:  The limited imaged lung bases are clear.    There is no significant abnormality of the liver, spleen, pancreas or adrenals.  The gallbladder is surgically absent.  There is no hydronephrosis or suspicious renal lesion.    No bowel obstruction. No significant inflammatory changes of the bowel.  Normal appendix.  No free air.    There is some bladder wall thickening but the bladder is not well distended.  There is IUD upper uterine segment.  Right adnexal cyst within normal limits for age.  No pelvic free fluid.  Abdominal aorta normal in caliber    No acute osseous findings.                                       X-Ray Chest 1 View (Final result)  Result time 04/12/23 13:05:01      Final result by Naresh Schmidt MD (04/12/23 13:05:01)                   Impression:      No acute findings.      Electronically signed by: Naresh Schmidt  Date:    04/12/2023  Time:    13:05               Narrative:    EXAMINATION:  XR CHEST 1 VIEW    CLINICAL HISTORY:  shortness of breath;    COMPARISON:  No priors    FINDINGS:  Portable frontal view of the chest was obtained. The heart is not enlarged.  Lungs are clear.  There is no pneumothorax or significant effusion.  Surgical clips overlie the right upper lung.                                       Medications   sodium chloride 0.9% bolus 1,000 mL 1,000 mL (has no administration in time range)   sodium chloride 0.9% bolus 1,000 mL 1,000 mL (0 mLs Intravenous Stopped 4/12/23 1230)   metoclopramide HCl injection 10 mg (10 mg Intravenous Given 4/12/23 1241)   iopamidoL (ISOVUE-370) injection 100 mL (100 mLs Intravenous Given 4/12/23 1420)     Medical Decision Making:   Initial  Assessment:   42-year-old female with a history of hypertension, diabetes, asthma, DVT,  pulmonary embolism, and thoracic outlet syndrome presents the ED with intermittent epigastric abdominal pain and diarrhea for the last month with intermittent nausea. Notes over the last week she also been having shortness of breath and chest pain.  States she has been using her inhaler with minimal relief.  States she called her doctor recently due to her ongoing symptoms, and had labs done yesterday and was told her lipase and amylase were extremely high. Notes having 1 episode of pancreatitis approximately 10 years ago, however notes it was due to her gallbladder which has since been removed.  Denies vomiting, fever, chills, cough, congestion, lower extremity pain or swelling. No ETOH use. States she also was told to stop taking her Ozempic this weekend by her PCP, last dose was Saturday.   Differential Diagnosis:     Pancreatitis, gastroenteritis, gastritis, electrolyte abnormality, metabolic acidosis, JOE, hyperglycemia, ACS, PE  Clinical Tests:   Lab Tests: Reviewed and Ordered  Radiological Study: Reviewed and Ordered  Medical Tests: Reviewed and Ordered  Other:   I have discussed this case with another health care provider.       <> Summary of the Discussion: I spoke with Dr. Adames regarding the care of this patient. She personally saw the patient face-to-face and agrees with the plan moving forward.               Attending Attestation:     Physician Attestation Statement for NP/PA:   I have conducted a face to face encounter with this patient in addition to the NP/PA, due to Medical Complexity    Other NP/PA Attestation Additions:    History of Present Illness: See ed course for attestation             ED Course as of 04/12/23 1512   Wed Apr 12, 2023   1215  Patient states she is mildly nauseous, however states pain is about 5/10 and does not want anything for pain at this time.  No Zofran makes her palpitations I  would rather another medication. Currently has IV fluids infusing [MA]   1228 Lipase(!): 631  CT scan was added, fluids infusing, will add another liter after it is completed [MA]   1237 Dr. Adames supervisory attestation  I performed substantive portion of MDM. I performed a history, physical exam, reviewed pertinent available previous records and discussed plan of care with PA I had face to face time evaluation of the patient    HPI: 41 yo f with h/o dm previously on ozembic (most recent injection satruday) here for eval of abnormal labs. Has been having intermittent epigastric pain and diarrhea for 1 month,w orse after eating and had outpatient labs performed showing elevated lipase and amylase. Cholecystectomy years ago, no etoh use, no previous episodes of pancreatitis, at this time pain is controleld. Reports decreased oral intake due to pain  PE:normocephalic, atraumatic  Regular rate, lungs clear  Abd soft, epigastric tenderness without rebound or guarding  MDM:pancreatitis likely drug induced, dc ozempic likely admit   [BS]   1448  paged [MA]   2405 [facility] , agrees with admission to Dr Shore for obs and IVF hydration; patient notified of plan and is agreeable  [MA]      ED Course User Index  [BS] Anay Adames MD  [MA] Ulises Munguia PA-C                 Clinical Impression:   Final diagnoses:  [R10.10] Upper abdominal pain  [K85.30] Drug-induced acute pancreatitis without infection or necrosis (Primary)        ED Disposition Condition    Observation Stable                Ulises Munguia PA-C  04/12/23 1512       Anay Adames MD  04/15/23 0711

## 2023-04-12 NOTE — FIRST PROVIDER EVALUATION
Medical screening examination initiated.  I have conducted a focused provider triage encounter, findings are as follows:  Chief Complaint   Patient presents with    Abdominal Pain     Pt. C/o upper abdominal pain x 1 month. Pt. Reports recent labs showed elevated Lipase.          Brief history of present illness:  41 y/o female who presents with upper abdominal pain over past month with nausea, diarrhea. On ozempic (stopped). She is DM. Had outpatient labs done yesterday which showed elevated lipase    There were no vitals filed for this visit.    Pertinent physical exam:  alert, nonlabored, ambulatory    Brief workup plan:  ekg, labs, urine    Preliminary workup initiated; this workup will be continued and followed by the physician or advanced practice provider that is assigned to the patient when roomed.

## 2023-04-13 VITALS
WEIGHT: 160 LBS | DIASTOLIC BLOOD PRESSURE: 61 MMHG | OXYGEN SATURATION: 97 % | SYSTOLIC BLOOD PRESSURE: 135 MMHG | TEMPERATURE: 98 F | BODY MASS INDEX: 29.44 KG/M2 | HEART RATE: 83 BPM | HEIGHT: 62 IN | RESPIRATION RATE: 20 BRPM

## 2023-04-13 LAB
ALBUMIN SERPL-MCNC: 3.5 G/DL (ref 3.5–5)
ALBUMIN/GLOB SERPL: 1.3 RATIO (ref 1.1–2)
ALP SERPL-CCNC: 47 UNIT/L (ref 40–150)
ALT SERPL-CCNC: 10 UNIT/L (ref 0–55)
AST SERPL-CCNC: 11 UNIT/L (ref 5–34)
BASOPHILS # BLD AUTO: 0.04 X10(3)/MCL (ref 0–0.2)
BASOPHILS NFR BLD AUTO: 0.6 %
BILIRUBIN DIRECT+TOT PNL SERPL-MCNC: 0.4 MG/DL
BUN SERPL-MCNC: 4.9 MG/DL (ref 7–18.7)
CALCIUM SERPL-MCNC: 8.7 MG/DL (ref 8.4–10.2)
CHLORIDE SERPL-SCNC: 110 MMOL/L (ref 98–107)
CO2 SERPL-SCNC: 21 MMOL/L (ref 22–29)
CREAT SERPL-MCNC: 0.65 MG/DL (ref 0.55–1.02)
EOSINOPHIL # BLD AUTO: 0.12 X10(3)/MCL (ref 0–0.9)
EOSINOPHIL NFR BLD AUTO: 1.9 %
ERYTHROCYTE [DISTWIDTH] IN BLOOD BY AUTOMATED COUNT: 14.1 % (ref 11.5–17)
GFR SERPLBLD CREATININE-BSD FMLA CKD-EPI: >60 MLS/MIN/1.73/M2
GLOBULIN SER-MCNC: 2.7 GM/DL (ref 2.4–3.5)
GLUCOSE SERPL-MCNC: 87 MG/DL (ref 74–100)
HCT VFR BLD AUTO: 36.9 % (ref 37–47)
HGB BLD-MCNC: 11.9 G/DL (ref 12–16)
IMM GRANULOCYTES # BLD AUTO: 0.04 X10(3)/MCL (ref 0–0.04)
IMM GRANULOCYTES NFR BLD AUTO: 0.6 %
LIPASE SERPL-CCNC: 80 U/L
LYMPHOCYTES # BLD AUTO: 2.29 X10(3)/MCL (ref 0.6–4.6)
LYMPHOCYTES NFR BLD AUTO: 36.9 %
MCH RBC QN AUTO: 27.4 PG (ref 27–31)
MCHC RBC AUTO-ENTMCNC: 32.2 G/DL (ref 33–36)
MCV RBC AUTO: 84.8 FL (ref 80–94)
MONOCYTES # BLD AUTO: 0.54 X10(3)/MCL (ref 0.1–1.3)
MONOCYTES NFR BLD AUTO: 8.7 %
NEUTROPHILS # BLD AUTO: 3.17 X10(3)/MCL (ref 2.1–9.2)
NEUTROPHILS NFR BLD AUTO: 51.3 %
NRBC BLD AUTO-RTO: 0 %
PLATELET # BLD AUTO: 358 X10(3)/MCL (ref 130–400)
PMV BLD AUTO: 8.9 FL (ref 7.4–10.4)
POCT GLUCOSE: 101 MG/DL (ref 70–110)
POTASSIUM SERPL-SCNC: 3.1 MMOL/L (ref 3.5–5.1)
PROT SERPL-MCNC: 6.2 GM/DL (ref 6.4–8.3)
RBC # BLD AUTO: 4.35 X10(6)/MCL (ref 4.2–5.4)
SODIUM SERPL-SCNC: 140 MMOL/L (ref 136–145)
WBC # SPEC AUTO: 6.2 X10(3)/MCL (ref 4.5–11.5)

## 2023-04-13 PROCEDURE — 83690 ASSAY OF LIPASE: CPT | Performed by: NURSE PRACTITIONER

## 2023-04-13 PROCEDURE — G0378 HOSPITAL OBSERVATION PER HR: HCPCS

## 2023-04-13 PROCEDURE — 63600175 PHARM REV CODE 636 W HCPCS: Performed by: NURSE PRACTITIONER

## 2023-04-13 PROCEDURE — 80053 COMPREHEN METABOLIC PANEL: CPT | Performed by: NURSE PRACTITIONER

## 2023-04-13 PROCEDURE — 25000242 PHARM REV CODE 250 ALT 637 W/ HCPCS: Performed by: STUDENT IN AN ORGANIZED HEALTH CARE EDUCATION/TRAINING PROGRAM

## 2023-04-13 PROCEDURE — 25000003 PHARM REV CODE 250: Performed by: INTERNAL MEDICINE

## 2023-04-13 PROCEDURE — 96361 HYDRATE IV INFUSION ADD-ON: CPT

## 2023-04-13 PROCEDURE — 85025 COMPLETE CBC W/AUTO DIFF WBC: CPT | Performed by: NURSE PRACTITIONER

## 2023-04-13 PROCEDURE — 25000003 PHARM REV CODE 250: Performed by: STUDENT IN AN ORGANIZED HEALTH CARE EDUCATION/TRAINING PROGRAM

## 2023-04-13 RX ORDER — POTASSIUM CHLORIDE 20 MEQ/1
40 TABLET, EXTENDED RELEASE ORAL ONCE
Status: COMPLETED | OUTPATIENT
Start: 2023-04-13 | End: 2023-04-13

## 2023-04-13 RX ORDER — ENOXAPARIN SODIUM 100 MG/ML
40 INJECTION SUBCUTANEOUS EVERY 24 HOURS
Status: DISCONTINUED | OUTPATIENT
Start: 2023-04-13 | End: 2023-04-13 | Stop reason: HOSPADM

## 2023-04-13 RX ADMIN — SODIUM CHLORIDE, POTASSIUM CHLORIDE, SODIUM LACTATE AND CALCIUM CHLORIDE: 600; 310; 30; 20 INJECTION, SOLUTION INTRAVENOUS at 06:04

## 2023-04-13 RX ADMIN — CARVEDILOL 6.25 MG: 3.12 TABLET, FILM COATED ORAL at 08:04

## 2023-04-13 RX ADMIN — FLUTICASONE PROPIONATE 100 MCG: 50 SPRAY, METERED NASAL at 08:04

## 2023-04-13 RX ADMIN — FLUTICASONE FUROATE 1 PUFF: 200 POWDER RESPIRATORY (INHALATION) at 08:04

## 2023-04-13 RX ADMIN — LISINOPRIL 20 MG: 20 TABLET ORAL at 08:04

## 2023-04-13 RX ADMIN — PANTOPRAZOLE SODIUM 40 MG: 40 TABLET, DELAYED RELEASE ORAL at 08:04

## 2023-04-13 RX ADMIN — POTASSIUM CHLORIDE 40 MEQ: 1500 TABLET, EXTENDED RELEASE ORAL at 08:04

## 2023-04-13 RX ADMIN — UMECLIDINIUM BROMIDE AND VILANTEROL TRIFENATATE 1 PUFF: 62.5; 25 POWDER RESPIRATORY (INHALATION) at 08:04

## 2023-04-13 NOTE — H&P
[facility] Hospital Medicine History & Physical Examination       Patient Name: Riya Red  Patient Class: OP- Observation   Admission Date: 4/12/2023   Length of Stay: 0  Attending Physician: Atif Shore MD  Primary Care Provider: Mani Gudino DO  Chief Complaint: Abdominal Pain (Pt. C/o upper abdominal pain x 1 month and diarrhea. Pt. Reports recent labs showed elevated Lipase. )        Patient information was obtained from patient, patient's family, past medical records and ER records.     HISTORY OF PRESENT ILLNESS:   A 42 years old female, with history of thoracic outlet syndrome, DVT/PE migraines presents the hospital with abdominal pain which is persistent for the last week and worsening for the last couple days.   She said that pain is in the epigastric area. Is mild with no radiation. She denies having fever chills sweating. Has nausea, however, does not complained of vomiting. She also states that shes having diarrhea episodes 3 to 4 times a  day for the last week. She is using ozempic for the diabetes.  Her labs are unremarkable besides lipase 600.   CT abdomen pelvis with contrast revealed more inflammation in the pancreas.   She has the criteria of pancreatitis.    Patient was admitted to the hospital with Pancreatitis.     REVIEW OF SYSTEMS:   Except as documented, all other systems reviewed and negative       PHYSICAL EXAM:     VITAL SIGNS: 24 HRS MIN & MAX LAST   Temp  Min: 97.6 °F (36.4 °C)  Max: 99 °F (37.2 °C) 98.3 °F (36.8 °C)   BP  Min: 128/84  Max: 158/109 135/61   Pulse  Min: 83  Max: 92  83   Resp  Min: 16  Max: 20 20   SpO2  Min: 97 %  Max: 100 % 97 %       General appearance: Well-developed, well-nourished female in no apparent distress.  HENT: Atraumatic head. Moist mucous membranes of oral cavity.  Eyes: Normal extraocular movements.   Neck: Supple.   Lungs: Clear to auscultation bilaterally. No wheezing present.   Heart: Regular rate and  rhythm. S1 and S2 present with no murmurs/gallop/rub. No pedal edema. No JVD present.   Abdomen: Soft, non-distended, mild-tender. No rebound tenderness/guarding. Bowel sounds are normal.   Extremities: No cyanosis, clubbing, or edema.  Skin: No Rash.   Neuro: Motor and sensory exams grossly intact. Good tone. Muscle strength 5/5 in all 4 extremities  Psych/mental status: Appropriate mood and affect. Responds appropriately to questions.       PAST MEDICAL HISTORY:     Past Medical History:   Diagnosis Date    DVT (deep venous thrombosis)     Essential (primary) hypertension     Migraine     Mild intermittent asthma, uncomplicated     Mixed hyperlipidemia     Other pulmonary embolism without acute cor pulmonale     Type 2 diabetes mellitus without complications        PAST SURGICAL HISTORY:     Past Surgical History:   Procedure Laterality Date     SECTION      CHOLECYSTECTOMY      2015    EMBOLECTOMY      THORACIC OUTLET SURGERY         ALLERGIES:   Bactrim [sulfamethoxazole-trimethoprim]    FAMILY HISTORY:   Reviewed and negative    SOCIAL HISTORY:     Social History     Tobacco Use    Smoking status: Never    Smokeless tobacco: Never   Substance Use Topics    Alcohol use: Never        HOME MEDICATIONS:     Prior to Admission medications    Medication Sig Start Date End Date Taking? Authorizing Provider   albuterol (PROVENTIL) 2.5 mg /3 mL (0.083 %) nebulizer solution Inhale 2.5 mg into the lungs every 6 (six) hours as needed. 23   Historical Provider   albuterol-ipratropium (DUO-NEB) 2.5 mg-0.5 mg/3 mL nebulizer solution Inhale 3 mLs into the lungs every 6 (six) hours as needed. 1/3/23 12/29/23  Historical Provider   aspirin (ECOTRIN) 81 MG EC tablet Take 1 tablet by mouth Daily. 18   Historical Provider   azelastine (ASTELIN) 137 mcg (0.1 %) nasal spray 1 spray by Nasal route Daily. 23  Historical Provider   benazepriL (LOTENSIN) 20 MG tablet Take 20 mg by mouth Daily. 23  8/27/23  Historical Provider   carvediloL (COREG) 6.25 MG tablet Take 6.25 mg by mouth 2 (two) times a day. 10/3/22 6/10/23  Historical Provider   cetirizine (ZYRTEC) 10 MG tablet Take 10 mg by mouth every evening. 2/28/23 8/27/23  Historical Provider   fluticasone propionate (FLONASE) 50 mcg/actuation nasal spray 2 sprays by Nasal route Daily. 2/28/23 8/27/23  Historical Provider   gabapentin (NEURONTIN) 800 MG tablet Take 1 tablet by mouth every evening.    Historical Provider   galcanezumab-gnlm 120 mg/mL PnIj Inject 120 mg into the skin every 28 days. maintenance dose 4/4/23   SERGIO Raymond   insulin lispro 100 unit/mL injection Inject into the skin as needed. 2/6/23   Historical Provider   metFORMIN (GLUMETZA) 1000 MG (MOD) 24hr tablet Take 1 tablet by mouth 2 (two) times a day.    Historical Provider   montelukast (SINGULAIR) 10 mg tablet Take 10 mg by mouth every evening. 5/4/21 7/15/23  Historical Provider   omeprazole (PRILOSEC) 20 MG capsule Take 20 mg by mouth as needed.    Historical Provider   OZEMPIC 0.25 mg or 0.5 mg(2 mg/1.5 mL) pen injector Inject 0.5 mg into the skin every 7 days. 9/9/22   Historical Provider   pravastatin (PRAVACHOL) 20 MG tablet Take 20 mg by mouth every evening. 5/16/22 5/14/23  Historical Provider   rizatriptan (MAXALT) 10 MG tablet Take 1 tablet by mouth as needed.    Historical Provider   topiramate (TOPAMAX) 50 MG tablet Take 1 tablet by mouth every evening. 1/7/19   Historical Provider   TRELEGY ELLIPTA 200-62.5-25 mcg inhaler Inhale 1 puff into the lungs every evening. 2/8/23   Historical Provider   venlafaxine (EFFEXOR-XR) 150 MG Cp24 Take 150 mg by mouth Daily. 12/1/22 6/27/23  Historical Provider         __________________________________________________________________________  INPATIENT LIST OF MEDICATIONS     Scheduled Meds:   aspirin  81 mg Oral Daily    carvediloL  6.25 mg Oral BID    cetirizine  10 mg Oral QHS    fluticasone furoate  1 puff Inhalation Daily     gabapentin  800 mg Oral QHS    lisinopriL  20 mg Oral Daily    montelukast  10 mg Oral QHS    pantoprazole  40 mg Oral Daily    pravastatin  20 mg Oral QHS    topiramate  50 mg Oral QHS    umeclidinium-vilanteroL  1 puff Inhalation Daily    venlafaxine  150 mg Oral Daily     Continuous Infusions:   lactated ringers 125 mL/hr at 04/13/23 0606     PRN Meds:.acetaminophen, albuterol-ipratropium, dextrose 10%, dextrose 10%, fluticasone propionate, glucagon (human recombinant), glucose, glucose, insulin aspart U-100, melatonin, naloxone, simethicone, sodium chloride 0.9%, sumatriptan      I, _ NP/PA have reviewed and discussed the case with Dr. _   Please see the following addendum for further assessment and plan from there attending MD.    04/13/2023      LABS AND IMAGING:     Recent Labs   Lab 04/12/23  0942 04/13/23  0420   WBC 7.3 6.2   RBC 4.87 4.35   HGB 13.1 11.9*   HCT 40.3 36.9*   MCV 82.8 84.8   MCH 26.9* 27.4   MCHC 32.5* 32.2*   RDW 14.1 14.1    358   MPV 8.8 8.9       Recent Labs   Lab 04/12/23  0942 04/13/23  0420    140   K 3.7 3.1*   CO2 25 21*   BUN 9.6 4.9*   CREATININE 0.75 0.65   CALCIUM 9.7 8.7   ALBUMIN 4.2 3.5   ALKPHOS 55 47   ALT 13 10   AST 12 11   BILITOT 0.3 0.4       Microbiology Results (last 7 days)       ** No results found for the last 168 hours. **             US Abdomen Complete  Narrative: EXAMINATION:  US ABDOMEN COMPLETE    CLINICAL HISTORY:  elevated lipase abdominal pain;    COMPARISON:  12 April 2023.    FINDINGS:  Grayscale, color and spectral Doppler evaluation of the abdomen.    No focal abnormality of the limited visualized pancreas.    Visualized portions of the aorta and inferior vena cava are normal in caliber.    Liver is not significantly enlarged.  Heterogeneous slightly increased hepatic parenchymal echogenicity.  Normal hepatopetal flow is noted in the portal vein.    Gallbladder surgically absent.  The common bile duct is normal in caliber  and measures 4  mm.    The right kidney measures 11 cm, while the left measures 10 cm.  No hydronephrosis.  There is a 1 cm left renal cyst for which no follow-up is needed.    Spleen upper limit of normal in size measuring 12 cm.  Impression: 1. Suspect hepatic steatosis.  2. Status post cholecystectomy with no biliary ductal dilatation.    Electronically signed by: Naresh Schmidt  Date:    04/12/2023  Time:    16:42  CT Abdomen Pelvis With Contrast  Narrative: EXAMINATION:  CT ABDOMEN PELVIS WITH CONTRAST    CLINICAL HISTORY:  Pancreatitis, acute, severe;Epigastric pain;    TECHNIQUE:  Helical acquisition through the abdomen and pelvis with IV contrast.  Three plane reconstructions were provided for review.  mGycm. Automatic exposure control, adjustment of mA/kV or iterative reconstruction technique was used to reduce radiation.    COMPARISON:  No priors    FINDINGS:  The limited imaged lung bases are clear.    There is no significant abnormality of the liver, spleen, pancreas or adrenals.  The gallbladder is surgically absent.  There is no hydronephrosis or suspicious renal lesion.    No bowel obstruction. No significant inflammatory changes of the bowel.  Normal appendix.  No free air.    There is some bladder wall thickening but the bladder is not well distended.  There is IUD upper uterine segment.  Right adnexal cyst within normal limits for age.  No pelvic free fluid.  Abdominal aorta normal in caliber    No acute osseous findings.  Impression: 1. Bladder wall thickening, question cystitis.  2. Otherwise no acute abdominopelvic findings.    Electronically signed by: Naresh Schmidt  Date:    04/12/2023  Time:    14:26  X-Ray Chest 1 View  Narrative: EXAMINATION:  XR CHEST 1 VIEW    CLINICAL HISTORY:  shortness of breath;    COMPARISON:  No priors    FINDINGS:  Portable frontal view of the chest was obtained. The heart is not enlarged.  Lungs are clear.  There is no pneumothorax or significant effusion.  Surgical clips  overlie the right upper lung.  Impression: No acute findings.    Electronically signed by: Naresh Schmidt  Date:    04/12/2023  Time:    13:05        ASSESSMENT & PLAN:   Acute pancreatitis  Possible GE  Migraine   DM2  HLD      Her symptoms are not severe and her gastric tenderness very mild. Will start clear liquid diet an advance as tolerated.   Pain control.  IVFs with  cc/hr   I had a discussion regarding her history of DVT/PE. Shes being followed by a vascular surgeon as a patient. She was on anticoagulation multiple times, and she only takes aspirin 81 mg now. I recommend it for her to follow with her physicians as outpatient starting on the corporation could be appropriate for her. Shell like to discussed that with pcp and vascular surgeon.      VTE Prophylaxis:lovenox      All diagnosis and differential diagnosis have been reviewed; assessment and plan has been documented; I have personally reviewed the labs and test results that are presently available; I have reviewed the patients medication list; I have reviewed the consulting providers response and recommendations. I have reviewed or attempted to review medical records based upon their availability.        Atif Shore MD   04/13/2023

## 2023-04-13 NOTE — PROGRESS NOTES
42 years old, female, with history of thoracic outlet syndrome, DVT/PE migraines presents the hospital with abdominal pain which is persistent for the last week and worsening for the last couple days.   She said that pain is in the epigastric area. Is mild with no radiation. She denies having fever chills sweating. Has nausea, however, does not complained of vomiting. She also states that shes having diarrhea episodes 3 to 4 times a  day for the last week. She is using ozempic for the diabetes.  Her labs are unremarkable besides lipase 600.   CT abdomen pelvis with contrast revealed more inflammation in the pancreas.   She has the criteria of pancreatitis.  Her symptoms are not severe and her gastric. Tenderness very mild. Will start clear liquid diet an advance as tolerated.   Pain control.  I had a discussion regarding her history of DVT/PE. Donna douglas followed by a vascular surgeon as a patient. She was on anticoagulation multiple times, and she only takes aspirin 81 mg now. I recommend it for her to follow with her physicians as outpatient starting on the corporation could be appropriate for her. Shell like to discussed that with pcp and vascular surgeon.

## 2023-04-13 NOTE — DISCHARGE SUMMARY
[facility]  HOSPITAL MEDICINE   DISCHARGE SUMMARY    Patient Name: Riya Red  MRN: [id number]  Admission Date: 4/12/2023  Hospital Length of Stay: 0 days  Discharge Date and Time: 04/13/2023  Discharge Provider: Raulito Green  Primary Care Provider: Mani Gudino DO      HOSPITAL COURSE   42-year-old female with a history of hypertension, diabetes, asthma, DVT,  pulmonary embolism, and thoracic outlet syndrome presents the ED with intermittent epigastric abdominal pain and diarrhea for the last month with intermittent nausea. Notes over the last week she also been having shortness of breath and chest pain.  States she has been using her inhaler with minimal relief.  States she called her doctor recently due to her ongoing symptoms, and had labs done yesterday and was told her lipase and amylase were extremely high. Notes having 1 episode of pancreatitis approximately 10 years ago, however notes it was due to her gallbladder which has since been removed.  Denies vomiting, fever, chills, cough, congestion, lower extremity pain or swelling. No ETOH use. States she also was told to stop taking her Ozempic this weekend by her PCP, last dose was Saturday.     Essentially admitted for abdominal pain and diarrhea which both are almost resolved this morning.  She is tolerating her diet will plan for discharge home.  As cause of pancreatitis is unclear but possibly secondary to Ozempic, have discussed with her to hold this until seen by PCP.  Her lipase initially was 600 however has come down to 80.  Ultrasound of the right upper quadrant shows hepatic steatosis, status post cholecystectomy and no biliary ductal dilation.  CT of the abdomen and pelvis showed no acute process.        PHYSICAL EXAM     Most Recent Vital Signs:  Temp: 98.3 °F (36.8 °C) (04/13/23 0739)  Pulse: 83 (04/13/23 0849)  Resp: 20 (04/13/23 0849)  BP: 135/61 (04/13/23 0849)  SpO2: 97 % (04/13/23 0849)   GENERAL: In  no acute distress, afebrile  HEENT:  CHEST: Clear to auscultation bilaterally  HEART: S1, S2, no appreciable murmur  ABDOMEN: Soft, nontender, BS +  MSK: Warm, no lower extremity edema, no clubbing or cyanosis  NEUROLOGIC: Alert and oriented x4, moving all extremities with good strength   INTEGUMENTARY:  PSYCHIATRY:          DISCHARGE DIAGNOSIS   Acute pancreatitis    _____________________________________________________________________________      DISCHARGE MED REC     Discharge Medication List as of 4/13/2023  9:41 AM        CONTINUE these medications which have NOT CHANGED    Details   albuterol (PROVENTIL) 2.5 mg /3 mL (0.083 %) nebulizer solution Inhale 2.5 mg into the lungs every 6 (six) hours as needed., Starting Tue 2/28/2023, Historical Med      albuterol-ipratropium (DUO-NEB) 2.5 mg-0.5 mg/3 mL nebulizer solution Inhale 3 mLs into the lungs every 6 (six) hours as needed., Starting Tue 1/3/2023, Until Fri 12/29/2023 at 2359, Historical Med      aspirin (ECOTRIN) 81 MG EC tablet Take 1 tablet by mouth Daily., Starting Mon 6/4/2018, Historical Med      azelastine (ASTELIN) 137 mcg (0.1 %) nasal spray 1 spray by Nasal route Daily., Starting Tue 2/28/2023, Until Sun 8/27/2023, Historical Med      benazepriL (LOTENSIN) 20 MG tablet Take 20 mg by mouth Daily., Starting Tue 2/28/2023, Until Sun 8/27/2023, Historical Med      carvediloL (COREG) 6.25 MG tablet Take 6.25 mg by mouth 2 (two) times a day., Starting Mon 10/3/2022, Until Sat 6/10/2023, Historical Med      cetirizine (ZYRTEC) 10 MG tablet Take 10 mg by mouth every evening., Starting Tue 2/28/2023, Until Sun 8/27/2023, Historical Med      fluticasone propionate (FLONASE) 50 mcg/actuation nasal spray 2 sprays by Nasal route Daily., Starting Tue 2/28/2023, Until Sun 8/27/2023, Historical Med      gabapentin (NEURONTIN) 800 MG tablet Take 1 tablet by mouth every evening., Historical Med      galcanezumab-gnlm 120 mg/mL PnIj Inject 120 mg into the skin every  28 days. maintenance dose, Starting Tue 4/4/2023, Normal      insulin lispro 100 unit/mL injection Inject into the skin as needed., Starting Mon 2/6/2023, Historical Med      metFORMIN (GLUMETZA) 1000 MG (MOD) 24hr tablet Take 1 tablet by mouth 2 (two) times a day., Historical Med      montelukast (SINGULAIR) 10 mg tablet Take 10 mg by mouth every evening., Starting Tue 5/4/2021, Until Sat 7/15/2023, Historical Med      omeprazole (PRILOSEC) 20 MG capsule Take 20 mg by mouth as needed., Historical Med      pravastatin (PRAVACHOL) 20 MG tablet Take 20 mg by mouth every evening., Starting Mon 5/16/2022, Until Sun 5/14/2023, Historical Med      rizatriptan (MAXALT) 10 MG tablet Take 1 tablet by mouth as needed., Historical Med      topiramate (TOPAMAX) 50 MG tablet Take 1 tablet by mouth every evening., Starting Mon 1/7/2019, Historical Med      TRELEGY ELLIPTA 200-62.5-25 mcg inhaler Inhale 1 puff into the lungs every evening., Starting Wed 2/8/2023, Historical Med      venlafaxine (EFFEXOR-XR) 150 MG Cp24 Take 150 mg by mouth Daily., Starting Thu 12/1/2022, Until Tue 6/27/2023, Historical Med           STOP taking these medications       OZEMPIC 0.25 mg or 0.5 mg(2 mg/1.5 mL) pen injector Comments:   Reason for Stopping:                  CONSULTS         FOLLOW UP      Follow-up Information       Mani Gudino, DO Follow up in 1 week(s).    Specialty: Family Medicine  Why: F/U Appointment: The office will call with an appointment date and time.  Contact information:  [address]  [address] [address] [phone]                                 DISCHARGE INSTRUCTIONS     Explained in detail to the patient about the discharge plan, medications, and follow-up visits. Pt understands and agrees with the treatment plan.  Discharged Condition: stable  Diet as tolerated  Activities as tolerated  Discharge to:  Home     TIME SPENT ON DISCHARGE   35 minutes        Raulito Dominguez M.D, on  4/13/2023  Internal Medicine  Department of [facility] Medicine    This document was created using electronic dictation services.  Please excuse any errors that may have been made.  Contact me if any questions regarding documentation to clarify verbiage.

## 2023-04-13 NOTE — NURSING
Nurses Note -- 4 Eyes      4/13/2023   12:31 AM      Skin assessed during: Admit      [x] No Pressure Injuries Present    []Prevention Measures Documented      [] Yes- Altered Skin Integrity Present or Discovered   [] LDA Added if Not in Epic (Describe Wound)   [] New Altered Skin Integrity was Present on Admit and Documented in LDA   [] Wound Image Taken    Wound Care Consulted? No    Attending Nurse:  Adelaide Negrete RN     Second RN/Staff Member:  Azra Cooley RN

## 2023-04-13 NOTE — PLAN OF CARE
Problem: Adult Inpatient Plan of Care  Goal: Plan of Care Review  Outcome: Met  Goal: Patient-Specific Goal (Individualized)  Outcome: Met  Goal: Absence of Hospital-Acquired Illness or Injury  Outcome: Met  Goal: Optimal Comfort and Wellbeing  Outcome: Met  Goal: Readiness for Transition of Care  Outcome: Met     Problem: Pain Acute  Goal: Acceptable Pain Control and Functional Ability  Outcome: Met     Problem: Hyperglycemia  Goal: Blood Glucose Level Within Targeted Range  Outcome: Met

## 2023-04-13 NOTE — PLAN OF CARE
Problem: Adult Inpatient Plan of Care  Goal: Plan of Care Review  Outcome: Ongoing, Progressing  Goal: Absence of Hospital-Acquired Illness or Injury  Outcome: Ongoing, Progressing  Goal: Optimal Comfort and Wellbeing  Outcome: Ongoing, Progressing     Problem: Pain Acute  Goal: Acceptable Pain Control and Functional Ability  Outcome: Ongoing, Progressing     Problem: Hyperglycemia  Goal: Blood Glucose Level Within Targeted Range  Outcome: Ongoing, Progressing

## 2023-04-20 ENCOUNTER — HOSPITAL ENCOUNTER (OUTPATIENT)
Dept: RADIOLOGY | Facility: HOSPITAL | Age: 43
Discharge: HOME | End: 2023-04-20
Attending: FAMILY MEDICINE
Payer: COMMERCIAL

## 2023-04-20 DIAGNOSIS — R10.12 ABDOMINAL PAIN, LEFT UPPER QUADRANT: ICD-10-CM

## 2023-04-20 DIAGNOSIS — R10.12 ABDOMINAL PAIN, LEFT UPPER QUADRANT: Primary | ICD-10-CM

## 2023-04-20 PROCEDURE — 25500020 PHARM REV CODE 255: Performed by: FAMILY MEDICINE

## 2023-04-20 PROCEDURE — 74178 CT ABD&PLV WO CNTR FLWD CNTR: CPT | Mod: TC

## 2023-04-20 RX ADMIN — IOPAMIDOL 100 ML: 755 INJECTION, SOLUTION INTRAVENOUS at 02:04

## 2023-04-27 ENCOUNTER — LAB REQUISITION (OUTPATIENT)
Dept: LAB | Facility: HOSPITAL | Age: 43
End: 2023-04-27
Payer: COMMERCIAL

## 2023-04-27 DIAGNOSIS — R10.13 EPIGASTRIC PAIN: ICD-10-CM

## 2023-04-27 DIAGNOSIS — R15.2 FECAL URGENCY: ICD-10-CM

## 2023-04-27 DIAGNOSIS — K56.7 ILEUS, UNSPECIFIED: ICD-10-CM

## 2023-04-27 DIAGNOSIS — R19.7 DIARRHEA, UNSPECIFIED: ICD-10-CM

## 2023-04-27 DIAGNOSIS — E11.9 TYPE 2 DIABETES MELLITUS WITHOUT COMPLICATIONS: ICD-10-CM

## 2023-04-27 DIAGNOSIS — R11.0 NAUSEA: ICD-10-CM

## 2023-04-27 DIAGNOSIS — R74.8 ABNORMAL LEVELS OF OTHER SERUM ENZYMES: ICD-10-CM

## 2023-04-27 DIAGNOSIS — R63.4 ABNORMAL WEIGHT LOSS: ICD-10-CM

## 2023-04-27 LAB — IGA SERPL-MCNC: 178 MG/DL (ref 65–421)

## 2023-04-27 PROCEDURE — 86003 ALLG SPEC IGE CRUDE XTRC EA: CPT | Performed by: INTERNAL MEDICINE

## 2023-04-27 PROCEDURE — 83516 IMMUNOASSAY NONANTIBODY: CPT | Performed by: INTERNAL MEDICINE

## 2023-04-27 PROCEDURE — 82784 ASSAY IGA/IGD/IGG/IGM EACH: CPT | Performed by: INTERNAL MEDICINE

## 2023-04-28 DIAGNOSIS — R10.13 ABDOMINAL PAIN, EPIGASTRIC: Primary | ICD-10-CM

## 2023-04-28 DIAGNOSIS — R11.0 NAUSEA: ICD-10-CM

## 2023-04-28 DIAGNOSIS — R74.8 ELEVATED LIPASE: ICD-10-CM

## 2023-04-28 DIAGNOSIS — R15.2 FECAL URGENCY: ICD-10-CM

## 2023-04-28 DIAGNOSIS — R19.7 DIARRHEA: ICD-10-CM

## 2023-04-28 LAB — ELIA CELIKEY IGA (TTG IGA): NEGATIVE

## 2023-05-02 LAB
ALLERGEN ALMOND IGE (OLG): <0.1
ALLERGEN CASHEW NUT IGE (OLG): <0.1
ALLERGEN CODFISH IGE (OLG): <0.1
ALLERGEN CRAB IGE (OLG): <0.1
ALLERGEN EGG WHITE IGE (OLG): <0.1
ALLERGEN EGG YOLK IGE (OLG): <0.1
ALLERGEN HAZELNUT IGE (OLG): <0.1
ALLERGEN MILK IGE (OLG): <0.1
ALLERGEN PEANUT IGE (OLG): <0.1
ALLERGEN SALMON IGE (OLG): <0.1
ALLERGEN SCALLOP IGE (OLG): <0.1
ALLERGEN SESAME SEED IGE (OLG): <0.1
ALLERGEN SHRIMP IGE (OLG): <0.1
ALLERGEN SOY BEAN IGE (OLG): <0.1
ALLERGEN TUNA IGE (OLG): <0.1
ALLERGEN WALNUT IGE (OLG): <0.1
ALLERGEN WHEAT IGE (OLG): <0.1
PHADIOTOP IGE QN: 20.6 KU/L

## 2023-07-24 DIAGNOSIS — G47.33 OSA (OBSTRUCTIVE SLEEP APNEA): Primary | ICD-10-CM

## 2023-10-31 ENCOUNTER — PATIENT MESSAGE (OUTPATIENT)
Dept: NEUROLOGY | Facility: CLINIC | Age: 43
End: 2023-10-31
Payer: COMMERCIAL

## 2023-11-10 ENCOUNTER — OFFICE VISIT (OUTPATIENT)
Dept: NEUROLOGY | Facility: CLINIC | Age: 43
End: 2023-11-10
Payer: COMMERCIAL

## 2023-11-10 VITALS
WEIGHT: 174 LBS | BODY MASS INDEX: 32.02 KG/M2 | HEIGHT: 62 IN | DIASTOLIC BLOOD PRESSURE: 84 MMHG | HEART RATE: 72 BPM | SYSTOLIC BLOOD PRESSURE: 116 MMHG

## 2023-11-10 DIAGNOSIS — H81.90 EPISODIC RECURRENT VERTIGO: ICD-10-CM

## 2023-11-10 DIAGNOSIS — G62.9 PERIPHERAL POLYNEUROPATHY: ICD-10-CM

## 2023-11-10 DIAGNOSIS — H81.4 VERTIGO OF CENTRAL ORIGIN: ICD-10-CM

## 2023-11-10 DIAGNOSIS — R42 DIZZINESS AND GIDDINESS: ICD-10-CM

## 2023-11-10 DIAGNOSIS — G43.E09 CHRONIC MIGRAINE WITH AURA WITHOUT STATUS MIGRAINOSUS, NOT INTRACTABLE: Primary | ICD-10-CM

## 2023-11-10 DIAGNOSIS — E34.8 PINEAL GLAND CYST: ICD-10-CM

## 2023-11-10 PROCEDURE — 3074F SYST BP LT 130 MM HG: CPT | Mod: CPTII,S$GLB,, | Performed by: NURSE PRACTITIONER

## 2023-11-10 PROCEDURE — 4010F ACE/ARB THERAPY RXD/TAKEN: CPT | Mod: CPTII,S$GLB,, | Performed by: NURSE PRACTITIONER

## 2023-11-10 PROCEDURE — 99999 PR PBB SHADOW E&M-EST. PATIENT-LVL IV: ICD-10-PCS | Mod: PBBFAC,,, | Performed by: NURSE PRACTITIONER

## 2023-11-10 PROCEDURE — 3079F DIAST BP 80-89 MM HG: CPT | Mod: CPTII,S$GLB,, | Performed by: NURSE PRACTITIONER

## 2023-11-10 PROCEDURE — 3008F PR BODY MASS INDEX (BMI) DOCUMENTED: ICD-10-PCS | Mod: CPTII,S$GLB,, | Performed by: NURSE PRACTITIONER

## 2023-11-10 PROCEDURE — 1159F MED LIST DOCD IN RCRD: CPT | Mod: CPTII,S$GLB,, | Performed by: NURSE PRACTITIONER

## 2023-11-10 PROCEDURE — 4010F PR ACE/ARB THEARPY RXD/TAKEN: ICD-10-PCS | Mod: CPTII,S$GLB,, | Performed by: NURSE PRACTITIONER

## 2023-11-10 PROCEDURE — 99215 OFFICE O/P EST HI 40 MIN: CPT | Mod: S$GLB,,, | Performed by: NURSE PRACTITIONER

## 2023-11-10 PROCEDURE — 3044F HG A1C LEVEL LT 7.0%: CPT | Mod: CPTII,S$GLB,, | Performed by: NURSE PRACTITIONER

## 2023-11-10 PROCEDURE — 3044F PR MOST RECENT HEMOGLOBIN A1C LEVEL <7.0%: ICD-10-PCS | Mod: CPTII,S$GLB,, | Performed by: NURSE PRACTITIONER

## 2023-11-10 PROCEDURE — 1160F PR REVIEW ALL MEDS BY PRESCRIBER/CLIN PHARMACIST DOCUMENTED: ICD-10-PCS | Mod: CPTII,S$GLB,, | Performed by: NURSE PRACTITIONER

## 2023-11-10 PROCEDURE — 3074F PR MOST RECENT SYSTOLIC BLOOD PRESSURE < 130 MM HG: ICD-10-PCS | Mod: CPTII,S$GLB,, | Performed by: NURSE PRACTITIONER

## 2023-11-10 PROCEDURE — 3079F PR MOST RECENT DIASTOLIC BLOOD PRESSURE 80-89 MM HG: ICD-10-PCS | Mod: CPTII,S$GLB,, | Performed by: NURSE PRACTITIONER

## 2023-11-10 PROCEDURE — 1160F RVW MEDS BY RX/DR IN RCRD: CPT | Mod: CPTII,S$GLB,, | Performed by: NURSE PRACTITIONER

## 2023-11-10 PROCEDURE — 99215 PR OFFICE/OUTPT VISIT, EST, LEVL V, 40-54 MIN: ICD-10-PCS | Mod: S$GLB,,, | Performed by: NURSE PRACTITIONER

## 2023-11-10 PROCEDURE — 1159F PR MEDICATION LIST DOCUMENTED IN MEDICAL RECORD: ICD-10-PCS | Mod: CPTII,S$GLB,, | Performed by: NURSE PRACTITIONER

## 2023-11-10 PROCEDURE — 3008F BODY MASS INDEX DOCD: CPT | Mod: CPTII,S$GLB,, | Performed by: NURSE PRACTITIONER

## 2023-11-10 PROCEDURE — 99999 PR PBB SHADOW E&M-EST. PATIENT-LVL IV: CPT | Mod: PBBFAC,,, | Performed by: NURSE PRACTITIONER

## 2023-11-10 RX ORDER — LEVALBUTEROL TARTRATE 45 UG/1
1-2 AEROSOL, METERED ORAL EVERY 4 HOURS PRN
COMMUNITY

## 2023-11-10 RX ORDER — CARVEDILOL 12.5 MG/1
12.5 TABLET ORAL 2 TIMES DAILY WITH MEALS
COMMUNITY

## 2023-11-10 RX ORDER — LEVALBUTEROL INHALATION SOLUTION 0.31 MG/3ML
1 SOLUTION RESPIRATORY (INHALATION) EVERY 4 HOURS PRN
COMMUNITY

## 2023-11-10 RX ORDER — RIMEGEPANT SULFATE 75 MG/75MG
75 TABLET, ORALLY DISINTEGRATING ORAL
Qty: 16 TABLET | Refills: 2 | Status: SHIPPED | OUTPATIENT
Start: 2023-11-10 | End: 2024-02-15

## 2023-11-10 RX ORDER — SUMATRIPTAN 6 MG/.5ML
0.5 INJECTION, SOLUTION SUBCUTANEOUS
Qty: 4 ML | Refills: 2 | Status: SHIPPED | OUTPATIENT
Start: 2023-11-10

## 2023-11-10 NOTE — PROGRESS NOTES
Subjective:       Patient ID: Riya Red is a 43 y.o. female.    Chief Complaint: Migraine (Since started on Emgality, migraines has improved. Ubrelvy is not as effective she hoped. Gets 1- 2 migraines a week now which is a huge improvement. Migraines varies to areas of head. Pain is sharp and stabbing. Migraines lasts for  2- 3 hours. Does have some nausea. States seems like her vision is deteriorating. Did see Dr. Melvin and was told she has diabetic retinopathy. ) and Peripheral Neuropathy (A little tingling to fingertips. In the last 2 months she started dropping  objects out of her hands. )      History of Present Illness:  Follow-up visit for migraines.  She was started on Emgality at last visit.  Prior to starting the medication, she was having 3-4 migraines a week that would last for 2-3 days at a time, but could last up to 6 days.  Now, she is having about 1-2 migraines a week.  She was prescribed Ubrelvy for rescue.  She says it is really hit or miss with Ubrelvy.  Sometimes it helps and other times it does not.  Maxalt still provides about 4 hours a benefit, but then loses efficacy.  She has significant smell sensitivity with her migraines so nasal spray not ideal.  She has never tried injectable Triptan.    She is still having difficulty with brain fog.  She has not had her MRI brain done yet, but it is scheduled for later this month.  Her vision has worsened since her last visit.  She is waiting to see Dr. Loyd for diabetic retinopathy.  She has been noticing an increase in tingling to her fingertips in his dropping things often.  Neuropathy was noted on her physical exam.  NONA with no monoclonal protein.  B12 was low in the 300s so she is now supplementing with oral vitamin B12.  She also wants to mentioned an increase in episodes of dizziness.  She says they are occurring about 2 to 3 times a week and are very brief in nature.  She is just noticed them more often over the last several months  and wanted to make us aware.  She has not been able to identify any trigger for the episodes.  No obvious association with position change or turning.  She says she will get the sensation that she is going to pass out.  She feels very faint.  She then has a very quick room spinning sensation and then the symptoms resolve.        Review of Systems  I have reviewed a 12 point review of systems which is negative unless otherwise stated in HPI        Past Medical History:   Diagnosis Date    DVT (deep venous thrombosis)     Essential (primary) hypertension     Migraine     Mild intermittent asthma, uncomplicated     Mixed hyperlipidemia     Other pulmonary embolism without acute cor pulmonale     Type 2 diabetes mellitus without complications        Past Surgical History:   Procedure Laterality Date     SECTION      CHOLECYSTECTOMY      2015    EMBOLECTOMY      THORACIC OUTLET SURGERY          Family History   Problem Relation Age of Onset    Dementia Mother     Hypertension Mother     Depression Mother         Social History     Socioeconomic History    Marital status:    Tobacco Use    Smoking status: Never    Smokeless tobacco: Never   Substance and Sexual Activity    Alcohol use: Never    Drug use: Never     Social Determinants of Health     Financial Resource Strain: Low Risk  (2023)    Overall Financial Resource Strain (CARDIA)     Difficulty of Paying Living Expenses: Not very hard   Food Insecurity: Food Insecurity Present (2023)    Hunger Vital Sign     Worried About Running Out of Food in the Last Year: Sometimes true     Ran Out of Food in the Last Year: Sometimes true   Transportation Needs: No Transportation Needs (2023)    PRAPARE - Transportation     Lack of Transportation (Medical): No     Lack of Transportation (Non-Medical): No   Physical Activity: Insufficiently Active (2023)    Exercise Vital Sign     Days of Exercise per Week: 1 day     Minutes of Exercise per  Session: 20 min   Stress: Stress Concern Present (4/13/2023)    [employer] [place] [employer] - Occupational Stress Questionnaire     Feeling of Stress : To some extent   Social Connections: Unknown (4/13/2023)    Social Connection and Isolation Panel [NHANES]     Frequency of Communication with Friends and Family: Once a week     Frequency of Social Gatherings with Friends and Family: Once a week     Active Member of Clubs or Organizations: Yes     Attends Club or Organization Meetings: More than 4 times per year     Marital Status:    Housing Stability: High Risk (4/13/2023)    Housing Stability Vital Sign     Unable to Pay for Housing in the Last Year: Yes     Number of Places Lived in the Last Year: 1     Unstable Housing in the Last Year: No        Outpatient Encounter Medications as of 11/10/2023   Medication Sig Dispense Refill    aspirin (ECOTRIN) 81 MG EC tablet Take 1 tablet by mouth Daily.      carvediloL (COREG) 12.5 MG tablet Take 12.5 mg by mouth 2 (two) times daily with meals.      empagliflozin (JARDIANCE) 25 mg tablet Take 25 mg by mouth once daily.      gabapentin (NEURONTIN) 800 MG tablet Take 1 tablet by mouth every evening.      galcanezumab-gnlm (EMGALITY PEN) 120 mg/mL PnIj INJECT 120 MG INTO THE SKIN EVERY 28 DAYS. MAINTENANCE DOSE 1 mL 5    insulin lispro 100 unit/mL injection Inject into the skin as needed.      levalbuterol (XOPENEX HFA) 45 mcg/actuation inhaler Inhale 1-2 puffs into the lungs every 4 (four) hours as needed for Wheezing. Rescue      levalbuterol (XOPENEX) 0.31 mg/3 mL nebulizer solution Take 1 ampule by nebulization every 4 (four) hours as needed for Wheezing. Rescue      metFORMIN (GLUMETZA) 1000 MG (MOD) 24hr tablet Take 1 tablet by mouth 2 (two) times a day.      omeprazole (PRILOSEC) 20 MG capsule Take 20 mg by mouth as needed.      rizatriptan (MAXALT) 10 MG tablet Take 1 tablet by mouth as needed.      SITagliptin phosphate (JANUVIA) 100 MG Tab Take  "100 mg by mouth every evening.      topiramate (TOPAMAX) 50 MG tablet Take 1 tablet by mouth every evening.      TRELEGY ELLIPTA 200-62.5-25 mcg inhaler Inhale 1 puff into the lungs every evening.      [DISCONTINUED] UBRELVY 100 mg tablet TAKE 1 TABLET (100 MG TOTAL) BY MOUTH ONCE AS NEEDED FOR MIGRAINE (CAN REPEAT DOSE IN 2 HOUR IF NEEDED DO NOT EXCEED 200 MG IN 24 HOURS). 16 tablet 5    albuterol (PROVENTIL) 2.5 mg /3 mL (0.083 %) nebulizer solution Inhale 2.5 mg into the lungs every 6 (six) hours as needed.      albuterol-ipratropium (DUO-NEB) 2.5 mg-0.5 mg/3 mL nebulizer solution Inhale 3 mLs into the lungs every 6 (six) hours as needed.      azelastine (ASTELIN) 137 mcg (0.1 %) nasal spray 1 spray by Nasal route Daily.      carvediloL (COREG) 6.25 MG tablet Take 6.25 mg by mouth 2 (two) times a day.      cetirizine (ZYRTEC) 10 MG tablet Take 10 mg by mouth every evening.      pravastatin (PRAVACHOL) 20 MG tablet Take 20 mg by mouth every evening.      rimegepant (NURTEC) 75 mg odt Take 1 tablet (75 mg total) by mouth as needed for Migraine. Place ODT tablet on the tongue; alternatively the ODT tablet may be placed under the tongue 16 tablet 2    SUMAtriptan succinate 6 mg/0.5 mL Syrg Inject 0.5 mLs into the skin as needed (migraine). 4 mL 2    venlafaxine (EFFEXOR-XR) 150 MG Cp24 Take 150 mg by mouth Daily.      [DISCONTINUED] benazepriL (LOTENSIN) 20 MG tablet Take 20 mg by mouth Daily.       No facility-administered encounter medications on file as of 11/10/2023.      Objective:   /84 (BP Location: Right arm)   Pulse 72   Ht 5' 2" (1.575 m)   Wt 78.9 kg (174 lb)   BMI 31.83 kg/m²        Physical Exam  NAD  alert and oriented  cognition and perception intact  no aphasia  EOMI  no facial asymmetry  no dysarthria  moves all extremities symmetrically  no gross coordination abnormalities  gait normal       Assessment & Plan:      1. Chronic migraine with aura without status migrainosus, not " intractable  Overview:  She began having migraines as a child.  She has tried several different medications including magnesium, B2, Effexor, Cymbalta, gabapentin, Topamax, nortriptyline, Imitrex, Relpax, Maxalt.  She is having migraines 3 to 4 times a week and they will last for about 2-3 days at a time, but have lasted up to 6 days.  She is currently on Topamax for preventive and Maxalt for rescue.  When she 1st started the Topamax many years ago, it was very helpful for her, but no longer seems to be effective.  She has side effects of word-finding and brain fog so an increase in dosage is not ideal.  She says the Maxalt is helpful, but only lasts for about 4 hours.  Her migraines can occur in several different regions including the right or left temporal region, the base of her skull, or retro orbitally on either side.  She describes the pain as is severe ice pick type pain with associated photophobia, phonophobia, osmophobia, and nausea.  Before she gets a migraine, she sees flashes of lights in her vision and experiences word-finding as well as an increase in yawning.  Triggers include weather changes and her menstrual cycle    Assessment & Plan:  -Emgality has provided sig improvement so will continue as ppx  -Ubrelvy not overly helpful as rescue so try nurtec instead  -Would prefer a CGRP be primary rescue if effective given her hx of clot, but can try sumatriptan injectable as an alternative to maxalt if Nurtec not helpful  -Stop topamax to see if that will improve brain fog, vision, and paresthesias at all    Orders:  -     rimegepant (NURTEC) 75 mg odt; Take 1 tablet (75 mg total) by mouth as needed for Migraine. Place ODT tablet on the tongue; alternatively the ODT tablet may be placed under the tongue  Dispense: 16 tablet; Refill: 2  -     SUMAtriptan succinate 6 mg/0.5 mL Syrg; Inject 0.5 mLs into the skin as needed (migraine).  Dispense: 4 mL; Refill: 2    2. Peripheral polyneuropathy  Overview:  NONA  normal, B12 level 336    Assessment & Plan:  -Likely diabetic.  continue B12 supplementation      3. Pineal gland cyst  Assessment & Plan:  -Await MRI brain results      4. Episodic recurrent vertigo  Assessment & Plan:  -Sounds benign, but given her history of DVT, aneursym, and PE, will get MRA h/n to r/o vascular cause      5. Dizziness and giddiness  -     MRA Brain without contrast; Future; Expected date: 11/10/2023  -     MRA Neck with and without contrast; Future; Expected date: 11/10/2023    6. Vertigo of central origin  -     MRA Brain without contrast; Future; Expected date: 11/10/2023  -     MRA Neck with and without contrast; Future; Expected date: 11/10/2023      Prior to the patient's arrival on the same day, I spent 5 minutes reviewing chart. Once in the exam room with the patient, I spent 30 minutes in the room with the member performing a history and exam as well as reviewing the test results and recommendations with the patient. After leaving the exam room, I spend an additional 5 minutes completing the electronic health record. The total time spent  that day caring for the member is 40 minutes, and this time--including the breakdown--is documented in the medical record.     This note was created with the assistance of voice recognition software. There may be transcription errors as a result of using this technology however minimal. Effort has been made to assure accuracy of transcription but any obvious errors or omissions should be clarified with the author of the document.

## 2023-11-10 NOTE — ASSESSMENT & PLAN NOTE
-Emgality has provided sig improvement so will continue as ppx  -Ubrelvy not overly helpful as rescue so try nurtec instead  -Would prefer a CGRP be primary rescue if effective given her hx of clot, but can try sumatriptan injectable as an alternative to maxalt if Nurtec not helpful  -Stop topamax to see if that will improve brain fog, vision, and paresthesias at all

## 2023-11-10 NOTE — ASSESSMENT & PLAN NOTE
-Sounds benign, but given her history of DVT, aneursym, and PE, will get MRA h/n to r/o vascular cause

## 2023-11-13 ENCOUNTER — TELEPHONE (OUTPATIENT)
Dept: NEUROLOGY | Facility: CLINIC | Age: 43
End: 2023-11-13
Payer: COMMERCIAL

## 2023-11-13 DIAGNOSIS — R42 DIZZINESS AND GIDDINESS: ICD-10-CM

## 2023-11-13 DIAGNOSIS — R42 VERTIGO: Primary | ICD-10-CM

## 2023-11-13 NOTE — TELEPHONE ENCOUNTER
----- Message from Debbie Espino sent at 11/13/2023 10:45 AM CST -----  Regarding: MRA Neck with and without contrast  Please reorder test as MRA Neck with contrast, so that we may schedule.      Thank You,Debbie Espino  Centralized Scheduling Dept

## 2023-11-21 ENCOUNTER — TELEPHONE (OUTPATIENT)
Dept: NEUROLOGY | Facility: CLINIC | Age: 43
End: 2023-11-21
Payer: COMMERCIAL

## 2023-11-21 DIAGNOSIS — H81.4 VERTIGO OF CENTRAL ORIGIN: Primary | ICD-10-CM

## 2023-11-21 NOTE — TELEPHONE ENCOUNTER
----- Message from Debbie Espino sent at 11/20/2023  2:14 PM CST -----  Regarding: MRA Neck without contrast  Please reorder this test as : MRA Neck with contrast . So that we may schedule.        Thank You,Debbie Espino  Centralized Scheduling Dept

## 2023-11-24 ENCOUNTER — HOSPITAL ENCOUNTER (OUTPATIENT)
Dept: RADIOLOGY | Facility: HOSPITAL | Age: 43
Discharge: HOME | End: 2023-11-24
Attending: NURSE PRACTITIONER
Payer: COMMERCIAL

## 2023-11-24 DIAGNOSIS — E34.8 PINEAL GLAND CYST: ICD-10-CM

## 2023-11-24 PROCEDURE — A9577 INJ MULTIHANCE: HCPCS | Performed by: NURSE PRACTITIONER

## 2023-11-24 PROCEDURE — 70553 MRI BRAIN STEM W/O & W/DYE: CPT | Mod: TC

## 2023-11-24 PROCEDURE — 25500020 PHARM REV CODE 255: Performed by: NURSE PRACTITIONER

## 2023-11-24 RX ADMIN — GADOBENATE DIMEGLUMINE 16 ML: 529 INJECTION, SOLUTION INTRAVENOUS at 02:11

## 2023-12-18 ENCOUNTER — OFFICE VISIT (OUTPATIENT)
Dept: ORTHOPEDIC SURGERY | Facility: CLINIC | Age: 43
End: 2023-12-18
Payer: COMMERCIAL

## 2023-12-18 DIAGNOSIS — M47.812 CERVICAL SPONDYLOSIS: Primary | ICD-10-CM

## 2023-12-18 PROCEDURE — 4010F PR ACE/ARB THEARPY RXD/TAKEN: ICD-10-PCS | Mod: CPTII,S$GLB,, | Performed by: ORTHOPAEDIC SURGERY

## 2023-12-18 PROCEDURE — 99204 PR OFFICE/OUTPT VISIT, NEW, LEVL IV, 45-59 MIN: ICD-10-PCS | Mod: S$GLB,,, | Performed by: ORTHOPAEDIC SURGERY

## 2023-12-18 PROCEDURE — 1159F PR MEDICATION LIST DOCUMENTED IN MEDICAL RECORD: ICD-10-PCS | Mod: CPTII,S$GLB,, | Performed by: ORTHOPAEDIC SURGERY

## 2023-12-18 PROCEDURE — 3044F HG A1C LEVEL LT 7.0%: CPT | Mod: CPTII,S$GLB,, | Performed by: ORTHOPAEDIC SURGERY

## 2023-12-18 PROCEDURE — 4010F ACE/ARB THERAPY RXD/TAKEN: CPT | Mod: CPTII,S$GLB,, | Performed by: ORTHOPAEDIC SURGERY

## 2023-12-18 PROCEDURE — 3044F PR MOST RECENT HEMOGLOBIN A1C LEVEL <7.0%: ICD-10-PCS | Mod: CPTII,S$GLB,, | Performed by: ORTHOPAEDIC SURGERY

## 2023-12-18 PROCEDURE — 1159F MED LIST DOCD IN RCRD: CPT | Mod: CPTII,S$GLB,, | Performed by: ORTHOPAEDIC SURGERY

## 2023-12-18 PROCEDURE — 99204 OFFICE O/P NEW MOD 45 MIN: CPT | Mod: S$GLB,,, | Performed by: ORTHOPAEDIC SURGERY
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
## 2023-12-28 ENCOUNTER — TELEPHONE (OUTPATIENT)
Dept: NEUROLOGY | Facility: CLINIC | Age: 43
End: 2023-12-28
Payer: COMMERCIAL

## 2023-12-28 DIAGNOSIS — M25.552 LEFT HIP PAIN: Primary | ICD-10-CM

## 2023-12-28 RX ORDER — MELOXICAM 15 MG/1
15 TABLET ORAL DAILY
Qty: 90 TABLET | Refills: 0 | Status: SHIPPED | OUTPATIENT
Start: 2023-12-28

## 2023-12-28 NOTE — TELEPHONE ENCOUNTER
Pt informed MRA head and neck were both normal and MRI showed incidental finding of small pineal gland cyst that is 8mm at times this can contribute to headaches usually when larger than 8mm MABEL Zhang does not currently think it is causing any major problems we can repeat MRI's periodically to ensure stability

## 2023-12-28 NOTE — TELEPHONE ENCOUNTER
----- Message from SERGIO Raymond sent at 12/28/2023  1:11 PM CST -----  Please let her know that her MRA head and neck was normal.  MRI showed incidental finding of small pineal gland cyst.  It is 8mm.  These can sometimes contribute to headaches, but generally when they are larger than 8 mm.  I don't think the cyst is currently causing any major problems.  We will repeat her MRI periodically to ensure it remains stable

## 2024-01-02 ENCOUNTER — LAB VISIT (OUTPATIENT)
Dept: LAB | Facility: HOSPITAL | Age: 44
End: 2024-01-02
Attending: NURSE PRACTITIONER
Payer: COMMERCIAL

## 2024-01-02 DIAGNOSIS — Z79.899 ENCOUNTER FOR LONG-TERM (CURRENT) USE OF OTHER MEDICATIONS: Primary | ICD-10-CM

## 2024-01-02 LAB
BASOPHILS # BLD AUTO: 0.06 X10(3)/MCL
BASOPHILS NFR BLD AUTO: 0.7 %
CREAT UR-MCNC: 107.6 MG/DL (ref 45–106)
EOSINOPHIL # BLD AUTO: 0.12 X10(3)/MCL (ref 0–0.9)
EOSINOPHIL NFR BLD AUTO: 1.5 %
ERYTHROCYTE [DISTWIDTH] IN BLOOD BY AUTOMATED COUNT: 16.4 % (ref 11.5–17)
FOLATE SERPL-MCNC: 9 NG/ML (ref 7–31.4)
HCT VFR BLD AUTO: 38 % (ref 37–47)
HGB BLD-MCNC: 11.3 G/DL (ref 12–16)
IMM GRANULOCYTES # BLD AUTO: 0.07 X10(3)/MCL (ref 0–0.04)
IMM GRANULOCYTES NFR BLD AUTO: 0.9 %
LYMPHOCYTES # BLD AUTO: 1.62 X10(3)/MCL (ref 0.6–4.6)
LYMPHOCYTES NFR BLD AUTO: 19.7 %
MCH RBC QN AUTO: 23.3 PG (ref 27–31)
MCHC RBC AUTO-ENTMCNC: 29.7 G/DL (ref 33–36)
MCV RBC AUTO: 78.5 FL (ref 80–94)
MICROALBUMIN UR-MCNC: 6.1 UG/ML
MICROALBUMIN/CREAT RATIO PNL UR: 5.7 MG/GM CR (ref 0–30)
MONOCYTES # BLD AUTO: 0.57 X10(3)/MCL (ref 0.1–1.3)
MONOCYTES NFR BLD AUTO: 6.9 %
NEUTROPHILS # BLD AUTO: 5.77 X10(3)/MCL (ref 2.1–9.2)
NEUTROPHILS NFR BLD AUTO: 70.3 %
NRBC BLD AUTO-RTO: 0 %
PLATELET # BLD AUTO: 436 X10(3)/MCL (ref 130–400)
PMV BLD AUTO: 8.4 FL (ref 7.4–10.4)
RBC # BLD AUTO: 4.84 X10(6)/MCL (ref 4.2–5.4)
TSH SERPL-ACNC: 1.72 UIU/ML (ref 0.35–4.94)
VIT B12 SERPL-MCNC: >2000 PG/ML (ref 213–816)
WBC # SPEC AUTO: 8.21 X10(3)/MCL (ref 4.5–11.5)

## 2024-01-02 PROCEDURE — 82607 VITAMIN B-12: CPT

## 2024-01-02 PROCEDURE — 36415 COLL VENOUS BLD VENIPUNCTURE: CPT

## 2024-01-02 PROCEDURE — 85025 COMPLETE CBC W/AUTO DIFF WBC: CPT

## 2024-01-02 PROCEDURE — 82043 UR ALBUMIN QUANTITATIVE: CPT

## 2024-01-02 PROCEDURE — 82746 ASSAY OF FOLIC ACID SERUM: CPT

## 2024-01-02 PROCEDURE — 84443 ASSAY THYROID STIM HORMONE: CPT

## 2024-02-14 DIAGNOSIS — G43.E09 CHRONIC MIGRAINE WITH AURA WITHOUT STATUS MIGRAINOSUS, NOT INTRACTABLE: ICD-10-CM

## 2024-02-15 RX ORDER — RIMEGEPANT SULFATE 75 MG/75MG
TABLET, ORALLY DISINTEGRATING ORAL
Qty: 16 TABLET | Refills: 3 | Status: SHIPPED | OUTPATIENT
Start: 2024-02-15 | End: 2024-03-15 | Stop reason: SDUPTHER

## 2024-03-15 ENCOUNTER — OFFICE VISIT (OUTPATIENT)
Dept: NEUROLOGY | Facility: CLINIC | Age: 44
End: 2024-03-15
Payer: COMMERCIAL

## 2024-03-15 VITALS
BODY MASS INDEX: 33.64 KG/M2 | HEART RATE: 79 BPM | HEIGHT: 62 IN | WEIGHT: 182.81 LBS | DIASTOLIC BLOOD PRESSURE: 95 MMHG | SYSTOLIC BLOOD PRESSURE: 132 MMHG

## 2024-03-15 DIAGNOSIS — H81.90 EPISODIC RECURRENT VERTIGO: ICD-10-CM

## 2024-03-15 DIAGNOSIS — E34.8 PINEAL GLAND CYST: ICD-10-CM

## 2024-03-15 DIAGNOSIS — G43.E09 CHRONIC MIGRAINE WITH AURA WITHOUT STATUS MIGRAINOSUS, NOT INTRACTABLE: Primary | ICD-10-CM

## 2024-03-15 DIAGNOSIS — G43.109 MIGRAINE WITH AURA AND WITHOUT STATUS MIGRAINOSUS, NOT INTRACTABLE: ICD-10-CM

## 2024-03-15 DIAGNOSIS — G62.9 PERIPHERAL POLYNEUROPATHY: ICD-10-CM

## 2024-03-15 PROCEDURE — 3080F DIAST BP >= 90 MM HG: CPT | Mod: CPTII,S$GLB,, | Performed by: NURSE PRACTITIONER

## 2024-03-15 PROCEDURE — 3008F BODY MASS INDEX DOCD: CPT | Mod: CPTII,S$GLB,, | Performed by: NURSE PRACTITIONER

## 2024-03-15 PROCEDURE — 3075F SYST BP GE 130 - 139MM HG: CPT | Mod: CPTII,S$GLB,, | Performed by: NURSE PRACTITIONER

## 2024-03-15 PROCEDURE — 99214 OFFICE O/P EST MOD 30 MIN: CPT | Mod: S$GLB,,, | Performed by: NURSE PRACTITIONER

## 2024-03-15 PROCEDURE — 1160F RVW MEDS BY RX/DR IN RCRD: CPT | Mod: CPTII,S$GLB,, | Performed by: NURSE PRACTITIONER

## 2024-03-15 PROCEDURE — 99999 PR PBB SHADOW E&M-EST. PATIENT-LVL IV: CPT | Mod: PBBFAC,,, | Performed by: NURSE PRACTITIONER

## 2024-03-15 PROCEDURE — 1159F MED LIST DOCD IN RCRD: CPT | Mod: CPTII,S$GLB,, | Performed by: NURSE PRACTITIONER

## 2024-03-15 RX ORDER — GALCANEZUMAB 120 MG/ML
120 INJECTION, SOLUTION SUBCUTANEOUS
Qty: 1 ML | Refills: 5 | Status: SHIPPED | OUTPATIENT
Start: 2024-03-15

## 2024-03-15 RX ORDER — RIMEGEPANT SULFATE 75 MG/75MG
TABLET, ORALLY DISINTEGRATING ORAL
Qty: 16 TABLET | Refills: 3 | Status: SHIPPED | OUTPATIENT
Start: 2024-03-15

## 2024-03-15 NOTE — PROGRESS NOTES
Subjective:       Patient ID: Riya Red is a 43 y.o. female.    Chief Complaint: Migraine (Pt states emgality helpful decrease of frequency and intensity nurtec and sumatriptan for rescue, if nurtec not helpful sumatriptan is able to abort)      History of Present Illness:  Follow-up Visit for migraines.  Emgality is still controlling migraines well.  She really only gets 1-2 migraines a week on the medication.  She has tried taking Nurtec, but it has not been effective.  Admittedly, she thinks this is because she waits too long to treat the migraines so she is trying to do a better job at taking it earlier on into the process.    Brain fog and dizziness discussed at last visit is better.  She is still supplementing with vitamin B12 which is helping with brain fog and neuropathy.  She had MRI brain and MRA head and neck after last visit.  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  MRA was normal.            Past Medical History:   Diagnosis Date    DVT (deep venous thrombosis)     Essential (primary) hypertension     Migraine     Mild intermittent asthma, uncomplicated     Mixed hyperlipidemia     Other pulmonary embolism without acute cor pulmonale     Type 2 diabetes mellitus without complications        Past Surgical History:   Procedure Laterality Date     SECTION      CHOLECYSTECTOMY      2015    EMBOLECTOMY      THORACIC OUTLET SURGERY          Family History   Problem Relation Age of Onset    Dementia Mother     Hypertension Mother     Depression Mother         Social History     Socioeconomic History    Marital status:    Tobacco Use    Smoking status: Never    Smokeless tobacco: Never   Substance and Sexual Activity    Alcohol use: Never    Drug use: Never     Social Determinants of Health     Financial Resource Strain: Low Risk  (2023)    Overall Financial Resource Strain (CARDIA)     Difficulty of Paying Living  Expenses: Not very hard   Food Insecurity: Food Insecurity Present (4/13/2023)    Hunger Vital Sign     Worried About Running Out of Food in the Last Year: Sometimes true     Ran Out of Food in the Last Year: Sometimes true   Transportation Needs: No Transportation Needs (4/13/2023)    PRAPARE - Transportation     Lack of Transportation (Medical): No     Lack of Transportation (Non-Medical): No   Physical Activity: Insufficiently Active (4/13/2023)    Exercise Vital Sign     Days of Exercise per Week: 1 day     Minutes of Exercise per Session: 20 min   Stress: Stress Concern Present (4/13/2023)    [employer] [place] [employer] - Occupational Stress Questionnaire     Feeling of Stress : To some extent   Social Connections: Unknown (4/13/2023)    Social Connection and Isolation Panel [NHANES]     Frequency of Communication with Friends and Family: Once a week     Frequency of Social Gatherings with Friends and Family: Once a week     Active Member of Clubs or Organizations: Yes     Attends Club or Organization Meetings: More than 4 times per year     Marital Status:    Housing Stability: High Risk (4/13/2023)    Housing Stability Vital Sign     Unable to Pay for Housing in the Last Year: Yes     Number of Places Lived in the Last Year: 1     Unstable Housing in the Last Year: No        Outpatient Encounter Medications as of 3/15/2024   Medication Sig Dispense Refill    albuterol (PROVENTIL) 2.5 mg /3 mL (0.083 %) nebulizer solution Inhale 2.5 mg into the lungs every 6 (six) hours as needed.      albuterol-ipratropium (DUO-NEB) 2.5 mg-0.5 mg/3 mL nebulizer solution Inhale 3 mLs into the lungs every 6 (six) hours as needed.      aspirin (ECOTRIN) 81 MG EC tablet Take 1 tablet by mouth Daily.      azelastine (ASTELIN) 137 mcg (0.1 %) nasal spray 1 spray by Nasal route Daily.      carvediloL (COREG) 12.5 MG tablet Take 12.5 mg by mouth 2 (two) times daily with meals.      cetirizine (ZYRTEC) 10 MG tablet  Take 10 mg by mouth every evening.      empagliflozin (JARDIANCE) 25 mg tablet Take 25 mg by mouth once daily.      gabapentin (NEURONTIN) 800 MG tablet Take 1 tablet by mouth every evening.      insulin lispro 100 unit/mL injection Inject into the skin as needed.      levalbuterol (XOPENEX HFA) 45 mcg/actuation inhaler Inhale 1-2 puffs into the lungs every 4 (four) hours as needed for Wheezing. Rescue      levalbuterol (XOPENEX) 0.31 mg/3 mL nebulizer solution Take 1 ampule by nebulization every 4 (four) hours as needed for Wheezing. Rescue      meloxicam (MOBIC) 15 MG tablet Take 1 tablet (15 mg total) by mouth once daily. 90 tablet 0    metFORMIN (GLUMETZA) 1000 MG (MOD) 24hr tablet Take 1 tablet by mouth 2 (two) times a day.      omeprazole (PRILOSEC) 20 MG capsule Take 20 mg by mouth as needed.      pravastatin (PRAVACHOL) 20 MG tablet Take 20 mg by mouth every evening.      rizatriptan (MAXALT) 10 MG tablet Take 1 tablet by mouth as needed.      SITagliptin phosphate (JANUVIA) 100 MG Tab Take 100 mg by mouth every evening.      SUMAtriptan succinate 6 mg/0.5 mL Syrg Inject 0.5 mLs into the skin as needed (migraine). 4 mL 2    TRELEGY ELLIPTA 200-62.5-25 mcg inhaler Inhale 1 puff into the lungs every evening.      [DISCONTINUED] galcanezumab-gnlm (EMGALITY PEN) 120 mg/mL PnIj INJECT 120 MG INTO THE SKIN EVERY 28 DAYS. MAINTENANCE DOSE 1 mL 5    [DISCONTINUED] rimegepant (NURTEC) 75 mg odt DISSOLVE 1 TABLET ON YOUR TONGUE AS NEEDED FOR MIGRAINE HEADACHE *MAX 1/DAY TOTAL* 16 tablet 3    galcanezumab-gnlm (EMGALITY PEN) 120 mg/mL PnIj Inject 1 mL (120 mg total) into the skin every 28 days. 1 mL 5    rimegepant (NURTEC) 75 mg odt DISSOLVE 1 TABLET ON YOUR TONGUE AS NEEDED FOR MIGRAINE HEADACHE *MAX 1/DAY TOTAL* 16 tablet 3    [DISCONTINUED] topiramate (TOPAMAX) 50 MG tablet Take 1 tablet by mouth every evening.      [DISCONTINUED] venlafaxine (EFFEXOR-XR) 150 MG Cp24 Take 150 mg by mouth Daily.       No  "facility-administered encounter medications on file as of 3/15/2024.      Objective:   BP (!) 132/95   Pulse 79   Ht 5' 2" (1.575 m)   Wt 82.9 kg (182 lb 12.8 oz)   BMI 33.43 kg/m²        Physical Exam  NAD  alert and oriented  cognition and perception intact  no aphasia  EOMI  no facial asymmetry  no dysarthria  moves all extremities symmetrically  no gross coordination abnormalities  gait normal       Assessment & Plan:      1. Chronic migraine with aura without status migrainosus, not intractable  Overview:  She began having migraines as a child.  She has tried several different medications including magnesium, B2, Effexor, Cymbalta, gabapentin, Topamax, nortriptyline, Imitrex, Relpax, Maxalt.  She is having migraines 3 to 4 times a week and they will last for about 2-3 days at a time, but have lasted up to 6 days.  She is currently on Topamax for preventive and Maxalt for rescue.  When she 1st started the Topamax many years ago, it was very helpful for her, but no longer seems to be effective.  She has side effects of word-finding and brain fog so an increase in dosage is not ideal.  She says the Maxalt is helpful, but only lasts for about 4 hours.  Her migraines can occur in several different regions including the right or left temporal region, the base of her skull, or retro orbitally on either side.  She describes the pain as is severe ice pick type pain with associated photophobia, phonophobia, osmophobia, and nausea.  Before she gets a migraine, she sees flashes of lights in her vision and experiences word-finding as well as an increase in yawning.  Triggers include weather changes and her menstrual cycle    Assessment & Plan:  -continue emgality and prn nurtec    Orders:  -     rimegepant (NURTEC) 75 mg odt; DISSOLVE 1 TABLET ON YOUR TONGUE AS NEEDED FOR MIGRAINE HEADACHE *MAX 1/DAY TOTAL*  Dispense: 16 tablet; Refill: 3    2. Peripheral polyneuropathy  Overview:  NONA normal, B12 level " 336    Assessment & Plan:  -continue b12 supplementation      3. Pineal gland cyst  Overview:  MRI brain 11/2023 with demarcated pineal gland cyst which measures 8 mm and shows thin peripheral rim of enhancement, but otherwise unremarkable    Assessment & Plan:  -repeat MRI brain in November 2024      4. Episodic recurrent vertigo  Overview:  MRA head and neck normal    Assessment & Plan:  -improved      5. Migraine with aura and without status migrainosus, not intractable  -     galcanezumab-gnlm (EMGALITY PEN) 120 mg/mL PnIj; Inject 1 mL (120 mg total) into the skin every 28 days.  Dispense: 1 mL; Refill: 5          This note was created with the assistance of voice recognition software. There may be transcription errors as a result of using this technology however minimal. Effort has been made to assure accuracy of transcription but any obvious errors or omissions should be clarified with the author of the document.

## 2024-08-19 ENCOUNTER — LAB VISIT (OUTPATIENT)
Dept: LAB | Facility: HOSPITAL | Age: 44
End: 2024-08-19
Attending: NURSE PRACTITIONER
Payer: COMMERCIAL

## 2024-08-19 DIAGNOSIS — Z79.899 ENCOUNTER FOR LONG-TERM (CURRENT) USE OF OTHER MEDICATIONS: Primary | ICD-10-CM

## 2024-08-19 LAB
25(OH)D3+25(OH)D2 SERPL-MCNC: 29 NG/ML (ref 30–80)
ALBUMIN SERPL-MCNC: 3.9 G/DL (ref 3.5–5)
ALBUMIN/GLOB SERPL: 1.3 RATIO (ref 1.1–2)
ALP SERPL-CCNC: 57 UNIT/L (ref 40–150)
ALT SERPL-CCNC: 27 UNIT/L (ref 0–55)
AMPHET UR QL SCN: NEGATIVE
ANION GAP SERPL CALC-SCNC: 12 MEQ/L
AST SERPL-CCNC: 19 UNIT/L (ref 5–34)
BARBITURATE SCN PRESENT UR: NEGATIVE
BASOPHILS # BLD AUTO: 0.06 X10(3)/MCL
BASOPHILS NFR BLD AUTO: 1.1 %
BENZODIAZ UR QL SCN: POSITIVE
BILIRUB SERPL-MCNC: 0.1 MG/DL
BUN SERPL-MCNC: 8.6 MG/DL (ref 7–18.7)
CALCIUM SERPL-MCNC: 9.1 MG/DL (ref 8.4–10.2)
CANNABINOIDS UR QL SCN: NEGATIVE
CHLORIDE SERPL-SCNC: 103 MMOL/L (ref 98–107)
CHOLEST SERPL-MCNC: 147 MG/DL
CHOLEST/HDLC SERPL: 5 {RATIO} (ref 0–5)
CO2 SERPL-SCNC: 26 MMOL/L (ref 22–29)
COCAINE UR QL SCN: NEGATIVE
CREAT SERPL-MCNC: 0.81 MG/DL (ref 0.55–1.02)
CREAT/UREA NIT SERPL: 11
EOSINOPHIL # BLD AUTO: 0.17 X10(3)/MCL (ref 0–0.9)
EOSINOPHIL NFR BLD AUTO: 3.1 %
ERYTHROCYTE [DISTWIDTH] IN BLOOD BY AUTOMATED COUNT: 16 % (ref 11.5–17)
EST. AVERAGE GLUCOSE BLD GHB EST-MCNC: 159.9 MG/DL
FENTANYL UR QL SCN: POSITIVE
GFR SERPLBLD CREATININE-BSD FMLA CKD-EPI: >60 ML/MIN/1.73/M2
GLOBULIN SER-MCNC: 2.9 GM/DL (ref 2.4–3.5)
GLUCOSE SERPL-MCNC: 196 MG/DL (ref 74–100)
HBA1C MFR BLD: 7.2 %
HCT VFR BLD AUTO: 33.7 % (ref 37–47)
HDLC SERPL-MCNC: 32 MG/DL (ref 35–60)
HGB BLD-MCNC: 10.5 G/DL (ref 12–16)
IMM GRANULOCYTES # BLD AUTO: 0.06 X10(3)/MCL (ref 0–0.04)
IMM GRANULOCYTES NFR BLD AUTO: 1.1 %
LDLC SERPL CALC-MCNC: 77 MG/DL (ref 50–140)
LYMPHOCYTES # BLD AUTO: 1.46 X10(3)/MCL (ref 0.6–4.6)
LYMPHOCYTES NFR BLD AUTO: 26.6 %
MCH RBC QN AUTO: 25.2 PG (ref 27–31)
MCHC RBC AUTO-ENTMCNC: 31.2 G/DL (ref 33–36)
MCV RBC AUTO: 80.8 FL (ref 80–94)
MDMA UR QL SCN: NEGATIVE
MONOCYTES # BLD AUTO: 0.43 X10(3)/MCL (ref 0.1–1.3)
MONOCYTES NFR BLD AUTO: 7.8 %
NEUTROPHILS # BLD AUTO: 3.31 X10(3)/MCL (ref 2.1–9.2)
NEUTROPHILS NFR BLD AUTO: 60.3 %
NRBC BLD AUTO-RTO: 0 %
OHS QRS DURATION: 88 MS
OHS QTC CALCULATION: 454 MS
OPIATES UR QL SCN: NEGATIVE
PCP UR QL: NEGATIVE
PH UR: 6 [PH] (ref 3–11)
PLATELET # BLD AUTO: 359 X10(3)/MCL (ref 130–400)
PMV BLD AUTO: 8.5 FL (ref 7.4–10.4)
POTASSIUM SERPL-SCNC: 3.9 MMOL/L (ref 3.5–5.1)
PROT SERPL-MCNC: 6.8 GM/DL (ref 6.4–8.3)
RBC # BLD AUTO: 4.17 X10(6)/MCL (ref 4.2–5.4)
SODIUM SERPL-SCNC: 141 MMOL/L (ref 136–145)
SPECIFIC GRAVITY, URINE AUTO (.000) (OHS): >1.03 (ref 1–1.03)
T3FREE SERPL-MCNC: 2.36 PG/ML (ref 1.58–3.91)
T4 FREE SERPL-MCNC: 0.79 NG/DL (ref 0.7–1.48)
TRIGL SERPL-MCNC: 191 MG/DL (ref 37–140)
TSH SERPL-ACNC: 1.79 UIU/ML (ref 0.35–4.94)
VIT B12 SERPL-MCNC: 539 PG/ML (ref 213–816)
VLDLC SERPL CALC-MCNC: 38 MG/DL
WBC # BLD AUTO: 5.49 X10(3)/MCL (ref 4.5–11.5)

## 2024-08-19 PROCEDURE — 80053 COMPREHEN METABOLIC PANEL: CPT

## 2024-08-19 PROCEDURE — 84443 ASSAY THYROID STIM HORMONE: CPT

## 2024-08-19 PROCEDURE — 93005 ELECTROCARDIOGRAM TRACING: CPT

## 2024-08-19 PROCEDURE — 80307 DRUG TEST PRSMV CHEM ANLYZR: CPT

## 2024-08-19 PROCEDURE — 84439 ASSAY OF FREE THYROXINE: CPT

## 2024-08-19 PROCEDURE — 85025 COMPLETE CBC W/AUTO DIFF WBC: CPT

## 2024-08-19 PROCEDURE — 93010 ELECTROCARDIOGRAM REPORT: CPT | Mod: ,,, | Performed by: STUDENT IN AN ORGANIZED HEALTH CARE EDUCATION/TRAINING PROGRAM

## 2024-08-19 PROCEDURE — 80061 LIPID PANEL: CPT

## 2024-08-19 PROCEDURE — 84481 FREE ASSAY (FT-3): CPT

## 2024-08-19 PROCEDURE — 36415 COLL VENOUS BLD VENIPUNCTURE: CPT

## 2024-08-19 PROCEDURE — 82607 VITAMIN B-12: CPT

## 2024-08-19 PROCEDURE — 83036 HEMOGLOBIN GLYCOSYLATED A1C: CPT

## 2024-08-19 PROCEDURE — 82306 VITAMIN D 25 HYDROXY: CPT

## 2024-09-19 DIAGNOSIS — G43.E09 CHRONIC MIGRAINE WITH AURA WITHOUT STATUS MIGRAINOSUS, NOT INTRACTABLE: ICD-10-CM

## 2024-09-20 RX ORDER — RIMEGEPANT SULFATE 75 MG/75MG
TABLET, ORALLY DISINTEGRATING ORAL
Qty: 16 TABLET | Refills: 3 | Status: SHIPPED | OUTPATIENT
Start: 2024-09-20

## 2025-01-17 DIAGNOSIS — G43.109 MIGRAINE WITH AURA AND WITHOUT STATUS MIGRAINOSUS, NOT INTRACTABLE: ICD-10-CM

## 2025-01-17 DIAGNOSIS — G43.E09 CHRONIC MIGRAINE WITH AURA WITHOUT STATUS MIGRAINOSUS, NOT INTRACTABLE: ICD-10-CM

## 2025-01-17 RX ORDER — RIMEGEPANT SULFATE 75 MG/75MG
TABLET, ORALLY DISINTEGRATING ORAL
Qty: 16 TABLET | Refills: 3 | Status: SHIPPED | OUTPATIENT
Start: 2025-01-17

## 2025-01-17 RX ORDER — GALCANEZUMAB 120 MG/ML
120 INJECTION, SOLUTION SUBCUTANEOUS
Qty: 1 ML | Refills: 5 | Status: SHIPPED | OUTPATIENT
Start: 2025-01-17

## 2025-01-17 RX ORDER — SUMATRIPTAN 6 MG/.5ML
0.5 INJECTION, SOLUTION SUBCUTANEOUS
Qty: 4 ML | Refills: 2 | Status: SHIPPED | OUTPATIENT
Start: 2025-01-17

## 2025-04-01 ENCOUNTER — LAB VISIT (OUTPATIENT)
Dept: LAB | Facility: HOSPITAL | Age: 45
End: 2025-04-01
Attending: NURSE PRACTITIONER
Payer: COMMERCIAL

## 2025-04-01 DIAGNOSIS — Z79.899 POLYPHARMACY: Primary | ICD-10-CM

## 2025-04-01 LAB
ACCEPTIBLE SP GR UR QL: 1.04 (ref 1–1.03)
ALBUMIN SERPL-MCNC: 3.8 G/DL (ref 3.5–5)
ALBUMIN/GLOB SERPL: 1.2 RATIO (ref 1.1–2)
ALP SERPL-CCNC: 49 UNIT/L (ref 40–150)
ALT SERPL-CCNC: 8 UNIT/L (ref 0–55)
AMPHET UR QL SCN: NEGATIVE
ANION GAP SERPL CALC-SCNC: 6 MEQ/L
AST SERPL-CCNC: 12 UNIT/L (ref 11–45)
BARBITURATE SCN PRESENT UR: NEGATIVE
BASOPHILS # BLD AUTO: 0.04 X10(3)/MCL
BASOPHILS NFR BLD AUTO: 0.9 %
BENZODIAZ UR QL SCN: POSITIVE
BILIRUB SERPL-MCNC: 0.2 MG/DL
BUN SERPL-MCNC: 9 MG/DL (ref 7–18.7)
CALCIUM SERPL-MCNC: 8.8 MG/DL (ref 8.4–10.2)
CANNABINOIDS UR QL SCN: NEGATIVE
CHLORIDE SERPL-SCNC: 104 MMOL/L (ref 98–107)
CHOLEST SERPL-MCNC: 164 MG/DL
CHOLEST/HDLC SERPL: 6 {RATIO} (ref 0–5)
CO2 SERPL-SCNC: 31 MMOL/L (ref 22–29)
COCAINE UR QL SCN: NEGATIVE
CREAT SERPL-MCNC: 0.82 MG/DL (ref 0.55–1.02)
CREAT/UREA NIT SERPL: 11
EOSINOPHIL # BLD AUTO: 0.1 X10(3)/MCL (ref 0–0.9)
EOSINOPHIL NFR BLD AUTO: 2.3 %
ERYTHROCYTE [DISTWIDTH] IN BLOOD BY AUTOMATED COUNT: 16.4 % (ref 11.5–17)
EST. AVERAGE GLUCOSE BLD GHB EST-MCNC: 128.4 MG/DL
FENTANYL UR QL SCN: NEGATIVE
GFR SERPLBLD CREATININE-BSD FMLA CKD-EPI: >60 ML/MIN/1.73/M2
GLOBULIN SER-MCNC: 3.1 GM/DL (ref 2.4–3.5)
GLUCOSE SERPL-MCNC: 76 MG/DL (ref 74–100)
HBA1C MFR BLD: 6.1 %
HCT VFR BLD AUTO: 34.5 % (ref 37–47)
HDLC SERPL-MCNC: 26 MG/DL (ref 35–60)
HGB BLD-MCNC: 9.9 G/DL (ref 12–16)
IMM GRANULOCYTES # BLD AUTO: 0.02 X10(3)/MCL (ref 0–0.04)
IMM GRANULOCYTES NFR BLD AUTO: 0.5 %
LDLC SERPL CALC-MCNC: 108 MG/DL (ref 50–140)
LYMPHOCYTES # BLD AUTO: 1.33 X10(3)/MCL (ref 0.6–4.6)
LYMPHOCYTES NFR BLD AUTO: 30.9 %
MCH RBC QN AUTO: 22.7 PG (ref 27–31)
MCHC RBC AUTO-ENTMCNC: 28.7 G/DL (ref 33–36)
MCV RBC AUTO: 78.9 FL (ref 80–94)
MDMA UR QL SCN: NEGATIVE
MONOCYTES # BLD AUTO: 0.49 X10(3)/MCL (ref 0.1–1.3)
MONOCYTES NFR BLD AUTO: 11.4 %
NEUTROPHILS # BLD AUTO: 2.33 X10(3)/MCL (ref 2.1–9.2)
NEUTROPHILS NFR BLD AUTO: 54 %
NRBC BLD AUTO-RTO: 0 %
OPIATES UR QL SCN: NEGATIVE
PCP UR QL: NEGATIVE
PH UR: 6.5 [PH] (ref 3–11)
PLATELET # BLD AUTO: 375 X10(3)/MCL (ref 130–400)
PMV BLD AUTO: 8.3 FL (ref 7.4–10.4)
POTASSIUM SERPL-SCNC: 3.2 MMOL/L (ref 3.5–5.1)
PROLACTIN LEVEL (OLG): 23.18 NG/ML (ref 5.18–26.53)
PROT SERPL-MCNC: 6.9 GM/DL (ref 6.4–8.3)
RBC # BLD AUTO: 4.37 X10(6)/MCL (ref 4.2–5.4)
SODIUM SERPL-SCNC: 141 MMOL/L (ref 136–145)
TRIGL SERPL-MCNC: 151 MG/DL (ref 37–140)
VLDLC SERPL CALC-MCNC: 30 MG/DL
WBC # BLD AUTO: 4.31 X10(3)/MCL (ref 4.5–11.5)

## 2025-04-01 PROCEDURE — 85025 COMPLETE CBC W/AUTO DIFF WBC: CPT

## 2025-04-01 PROCEDURE — 36415 COLL VENOUS BLD VENIPUNCTURE: CPT

## 2025-04-01 PROCEDURE — 83036 HEMOGLOBIN GLYCOSYLATED A1C: CPT

## 2025-04-01 PROCEDURE — 84146 ASSAY OF PROLACTIN: CPT

## 2025-04-01 PROCEDURE — 80307 DRUG TEST PRSMV CHEM ANLYZR: CPT

## 2025-04-01 PROCEDURE — 80061 LIPID PANEL: CPT

## 2025-04-01 PROCEDURE — 80053 COMPREHEN METABOLIC PANEL: CPT

## 2025-04-07 ENCOUNTER — APPOINTMENT (OUTPATIENT)
Dept: LAB | Facility: HOSPITAL | Age: 45
End: 2025-04-07
Attending: NURSE PRACTITIONER
Payer: COMMERCIAL

## 2025-04-07 DIAGNOSIS — Z79.899 POLYPHARMACY: Primary | ICD-10-CM

## 2025-04-07 PROCEDURE — 36415 COLL VENOUS BLD VENIPUNCTURE: CPT

## 2025-04-07 PROCEDURE — 80175 DRUG SCREEN QUAN LAMOTRIGINE: CPT

## 2025-04-09 LAB — LAMOTRIGINE SERPL-MCNC: 15.8 MCG/ML (ref 3–15)

## 2025-05-31 DIAGNOSIS — G43.E09 CHRONIC MIGRAINE WITH AURA WITHOUT STATUS MIGRAINOSUS, NOT INTRACTABLE: ICD-10-CM

## 2025-06-02 RX ORDER — RIMEGEPANT SULFATE 75 MG/75MG
TABLET, ORALLY DISINTEGRATING ORAL
Qty: 16 TABLET | Refills: 5 | Status: SHIPPED | OUTPATIENT
Start: 2025-06-02